# Patient Record
Sex: FEMALE | Race: WHITE | NOT HISPANIC OR LATINO | Employment: FULL TIME | URBAN - METROPOLITAN AREA
[De-identification: names, ages, dates, MRNs, and addresses within clinical notes are randomized per-mention and may not be internally consistent; named-entity substitution may affect disease eponyms.]

---

## 2021-08-12 ENCOUNTER — TELEPHONE (OUTPATIENT)
Dept: FAMILY MEDICINE CLINIC | Facility: CLINIC | Age: 36
End: 2021-08-12

## 2021-08-12 NOTE — TELEPHONE ENCOUNTER
Caller: June Reid    Relationship: Self    Best call back number: [phone]    Who is your current provider: N/A    Who would you like your new provider to be: DR CORREA    What are your reasons for transferring care: NO PCP    Additional notes: DR HARINI GREENBERG (CHILDRENS PEDIATRICIAN) RECOMMENDED JUNE

## 2021-11-16 ENCOUNTER — TELEPHONE (OUTPATIENT)
Dept: FAMILY MEDICINE CLINIC | Facility: CLINIC | Age: 36
End: 2021-11-16

## 2022-01-20 ENCOUNTER — OFFICE VISIT (OUTPATIENT)
Dept: FAMILY MEDICINE CLINIC | Facility: CLINIC | Age: 37
End: 2022-01-20

## 2022-01-20 ENCOUNTER — LAB (OUTPATIENT)
Dept: FAMILY MEDICINE CLINIC | Facility: CLINIC | Age: 37
End: 2022-01-20

## 2022-01-20 VITALS
RESPIRATION RATE: 18 BRPM | SYSTOLIC BLOOD PRESSURE: 111 MMHG | DIASTOLIC BLOOD PRESSURE: 79 MMHG | TEMPERATURE: 98.7 F | HEIGHT: 68 IN | BODY MASS INDEX: 27.43 KG/M2 | OXYGEN SATURATION: 100 % | WEIGHT: 181 LBS | HEART RATE: 72 BPM

## 2022-01-20 DIAGNOSIS — Z00.00 PREVENTATIVE HEALTH CARE: Primary | ICD-10-CM

## 2022-01-20 DIAGNOSIS — F98.8 ATTENTION DEFICIT DISORDER (ADD) WITHOUT HYPERACTIVITY: ICD-10-CM

## 2022-01-20 LAB
BASOPHILS # BLD AUTO: 0.06 10*3/MM3 (ref 0–0.2)
BASOPHILS NFR BLD AUTO: 0.6 % (ref 0–1.5)
DEPRECATED RDW RBC AUTO: 39.2 FL (ref 37–54)
EOSINOPHIL # BLD AUTO: 0.11 10*3/MM3 (ref 0–0.4)
EOSINOPHIL NFR BLD AUTO: 1.2 % (ref 0.3–6.2)
ERYTHROCYTE [DISTWIDTH] IN BLOOD BY AUTOMATED COUNT: 12.3 % (ref 12.3–15.4)
HCT VFR BLD AUTO: 45.5 % (ref 34–46.6)
HGB BLD-MCNC: 15 G/DL (ref 12–15.9)
IMM GRANULOCYTES # BLD AUTO: 0.04 10*3/MM3 (ref 0–0.05)
IMM GRANULOCYTES NFR BLD AUTO: 0.4 % (ref 0–0.5)
LYMPHOCYTES # BLD AUTO: 1.98 10*3/MM3 (ref 0.7–3.1)
LYMPHOCYTES NFR BLD AUTO: 21.3 % (ref 19.6–45.3)
MCH RBC QN AUTO: 28.9 PG (ref 26.6–33)
MCHC RBC AUTO-ENTMCNC: 33 G/DL (ref 31.5–35.7)
MCV RBC AUTO: 87.7 FL (ref 79–97)
MONOCYTES # BLD AUTO: 0.9 10*3/MM3 (ref 0.1–0.9)
MONOCYTES NFR BLD AUTO: 9.7 % (ref 5–12)
NEUTROPHILS NFR BLD AUTO: 6.19 10*3/MM3 (ref 1.7–7)
NEUTROPHILS NFR BLD AUTO: 66.8 % (ref 42.7–76)
NRBC BLD AUTO-RTO: 0 /100 WBC (ref 0–0.2)
PLATELET # BLD AUTO: 357 10*3/MM3 (ref 140–450)
PMV BLD AUTO: 10.1 FL (ref 6–12)
RBC # BLD AUTO: 5.19 10*6/MM3 (ref 3.77–5.28)
WBC NRBC COR # BLD: 9.28 10*3/MM3 (ref 3.4–10.8)

## 2022-01-20 PROCEDURE — 80053 COMPREHEN METABOLIC PANEL: CPT | Performed by: INTERNAL MEDICINE

## 2022-01-20 PROCEDURE — 82306 VITAMIN D 25 HYDROXY: CPT | Performed by: INTERNAL MEDICINE

## 2022-01-20 PROCEDURE — 85025 COMPLETE CBC W/AUTO DIFF WBC: CPT | Performed by: INTERNAL MEDICINE

## 2022-01-20 PROCEDURE — 36415 COLL VENOUS BLD VENIPUNCTURE: CPT | Performed by: INTERNAL MEDICINE

## 2022-01-20 PROCEDURE — 84443 ASSAY THYROID STIM HORMONE: CPT | Performed by: INTERNAL MEDICINE

## 2022-01-20 PROCEDURE — 99385 PREV VISIT NEW AGE 18-39: CPT | Performed by: INTERNAL MEDICINE

## 2022-01-20 RX ORDER — MULTIPLE VITAMINS W/ MINERALS TAB 9MG-400MCG
1 TAB ORAL DAILY
COMMUNITY
End: 2022-12-15

## 2022-01-20 NOTE — PROGRESS NOTES
Rooming Tab(CC,VS,Pt Hx,Fall Screen)  Chief Complaint   Patient presents with   • Establish Care     Patient indicated their consent to have their visit recorded and processed for the purpose of documenting their care today.    Subjective   June Reid is a 36 y.o. female who presents to [facility] care with new doctor.    ADD  The patient reports a history of ADD. She was diagnosed as a child. She denies taking medication in the past. She has been seen at a clinic and had a screening recently. She was prescribed Strattera which she took for a while and it did not help and caused her to feel tired.    Joint pain  The patient reports a history of knee and hip pain which she associates with her weight. She has lost weight in the past which alleviated the knee pain.     Skin lesions  She reports a history of skin lesions. She has a skin lesion on her let temproal which she would like removed today. She has had a skin lesion removed from her back in the past. She does follow with dermatology.    History of kidney stones  The patient reports a history of 3 kidney stones. She no longer drinks tea.    Surgical history  The patient reports bilateral ear surgery.    Health maintenance  The patient reports that she does take birth control.  She denies any reason to have needed a colonoscopy in the past.  She is up to date on her influenza, tetanus and hepatitis A vaccinations. She is due for a COVID-19 booster.  She did have a mammogram in .   She denies any problems with constipation, diarrhea, migraines, chest pains.  She denies any thyroid issues.    Family history  The patient reports that her mother  at age 50 secondary to non-smoking lung cancer. Her father has a history of cholesterol and blood pressure problems. Her father has a history of skin lesions removed that were not melanoma.  I have reviewed and updated her medications, medical history and problem list during today's office visit.     Patient  "Care Team:  Grace Rene MD as PCP - General (Internal Medicine)    Problem List Tab  Medications Tab  Synopsis Tab  Chart Review Tab  Care Everywhere Tab  Immunizations Tab  Patient History Tab    Social History     Tobacco Use   • Smoking status: Never Smoker   • Smokeless tobacco: Never Used   Substance Use Topics   • Alcohol use: Yes     Comment: occ       Review of Systems  A review of systems was performed, and the pertinent positives are noted in the HPI.    Objective     Rooming Tab(CC,VS,Pt Hx,Fall Screen)  /79   Pulse 72   Temp 98.7 °F (37.1 °C) (Temporal)   Resp 18   Ht 172.7 cm (68\")   Wt 82.1 kg (181 lb)   LMP 12/23/2021 (Within Days)   SpO2 100%   Breastfeeding No   BMI 27.52 kg/m²     Body mass index is 27.52 kg/m².    Physical Exam  Vitals and nursing note reviewed.   Constitutional:       Appearance: Normal appearance. She is well-developed.   HENT:      Head: Normocephalic and atraumatic.      Right Ear: Tympanic membrane normal.      Left Ear: Tympanic membrane normal.      Nose: No rhinorrhea.      Mouth/Throat:      Pharynx: No posterior oropharyngeal erythema.   Eyes:      Pupils: Pupils are equal, round, and reactive to light.   Cardiovascular:      Rate and Rhythm: Normal rate and regular rhythm.      Pulses: Normal pulses.      Heart sounds: Normal heart sounds. No murmur heard.      Pulmonary:      Effort: Pulmonary effort is normal.      Breath sounds: Normal breath sounds.   Abdominal:      General: Bowel sounds are normal. There is no distension.      Palpations: Abdomen is soft.   Musculoskeletal:         General: No tenderness.      Cervical back: Normal range of motion and neck supple.   Skin:     Capillary Refill: Capillary refill takes less than 2 seconds.      Findings: Lesion present.   Neurological:      Mental Status: She is alert and oriented to person, place, and time.   Psychiatric:         Mood and Affect: Mood normal.         Behavior: Behavior " normal.         Statin Choice Calculator  Data Reviewed:         The data below has been reviewed by Grace Rene MD on 01/20/2022.      Lab Results   Component Value Date    BUN 21 (H) 01/20/2022    CREATININE 0.78 01/20/2022    EGFRIFNONA 83 01/20/2022     01/20/2022    K 4.0 01/20/2022     01/20/2022    CALCIUM 9.6 01/20/2022    ALBUMIN 4.90 01/20/2022    BILITOT 0.6 01/20/2022    ALKPHOS 54 01/20/2022    AST 18 01/20/2022    ALT 15 01/20/2022    WBC 9.28 01/20/2022    RBC 5.19 01/20/2022    HCT 45.5 01/20/2022    MCV 87.7 01/20/2022    MCH 28.9 01/20/2022    TSH 0.986 01/20/2022    LPYV23WQ 27.8 (L) 01/20/2022      Assessment/Plan   Order Review Tab  Health Maintenance Tab  Patient Plan/Order Tab  Diagnoses and all orders for this visit:    1. Preventative health care (Primary)  -     Discontinue: lisdexamfetamine (Vyvanse) 40 MG capsule; Take 1 capsule by mouth Every Morning  Dispense: 30 capsule; Refill: 0  -     Comprehensive Metabolic Panel  -     CBC Auto Differential  -     Vitamin D 25 Hydroxy  -     TSH    2. Attention deficit disorder (ADD) without hyperactivity  Assessment & Plan:  CAARS forms given to patient to be returned next week.  [place] tart vyvanse- advised to get coupon try at 40mg and adjust as needed.    3. Skin lesion        - Skin lesion was removed today via cryotherapy.        - Follow with dermatology at a minimum of every other year.        Wrapup Tab  Return if symptoms worsen or fail to improve.       During this visit for their annual exam, we reviewed their personal history, social history and family history.  We went over their medications and all the recommended health maintenence items for their age group. They were given the opportunity to ask questions and discuss other concerns.      Patient's Body mass index is 27.52 kg/m². indicating that she is within normal range (BMI 18.5-24.9). No BMI management plan needed..

## 2022-01-21 ENCOUNTER — TELEPHONE (OUTPATIENT)
Dept: FAMILY MEDICINE CLINIC | Facility: CLINIC | Age: 37
End: 2022-01-21

## 2022-01-21 DIAGNOSIS — F98.8 ATTENTION DEFICIT DISORDER (ADD) WITHOUT HYPERACTIVITY: Primary | ICD-10-CM

## 2022-01-21 LAB
25(OH)D3 SERPL-MCNC: 27.8 NG/ML (ref 30–100)
ALBUMIN SERPL-MCNC: 4.9 G/DL (ref 3.5–5.2)
ALBUMIN/GLOB SERPL: 2.3 G/DL
ALP SERPL-CCNC: 54 U/L (ref 39–117)
ALT SERPL W P-5'-P-CCNC: 15 U/L (ref 1–33)
ANION GAP SERPL CALCULATED.3IONS-SCNC: 11.4 MMOL/L (ref 5–15)
AST SERPL-CCNC: 18 U/L (ref 1–32)
BILIRUB SERPL-MCNC: 0.6 MG/DL (ref 0–1.2)
BUN SERPL-MCNC: 21 MG/DL (ref 6–20)
BUN/CREAT SERPL: 26.9 (ref 7–25)
CALCIUM SPEC-SCNC: 9.6 MG/DL (ref 8.6–10.5)
CHLORIDE SERPL-SCNC: 102 MMOL/L (ref 98–107)
CO2 SERPL-SCNC: 23.6 MMOL/L (ref 22–29)
CREAT SERPL-MCNC: 0.78 MG/DL (ref 0.57–1)
GFR SERPL CREATININE-BSD FRML MDRD: 83 ML/MIN/1.73
GLOBULIN UR ELPH-MCNC: 2.1 GM/DL
GLUCOSE SERPL-MCNC: 88 MG/DL (ref 65–99)
POTASSIUM SERPL-SCNC: 4 MMOL/L (ref 3.5–5.2)
PROT SERPL-MCNC: 7 G/DL (ref 6–8.5)
SODIUM SERPL-SCNC: 137 MMOL/L (ref 136–145)
TSH SERPL DL<=0.05 MIU/L-ACNC: 0.99 UIU/ML (ref 0.27–4.2)

## 2022-01-21 RX ORDER — DEXMETHYLPHENIDATE HYDROCHLORIDE 20 MG/1
20 CAPSULE, EXTENDED RELEASE ORAL DAILY
Qty: 30 CAPSULE | Refills: 0 | Status: SHIPPED | OUTPATIENT
Start: 2022-01-21 | End: 2022-02-21 | Stop reason: SDUPTHER

## 2022-01-21 NOTE — TELEPHONE ENCOUNTER
Caller: June Reid    Relationship: Self    Best call back number: [phone]  What medication are you requesting: ALTERNATIVE FOR VYVANSE, TOO EXPENSIVE, WILL NEED SOMETHING CALLED IN LESS EXPENSIVE  What are your current symptoms:     How long have you been experiencing symptoms:    Have you had these symptoms before:    [x] Yes  [] No    Have you been treated for these symptoms before:   [x] Yes  [] No    If a prescription is needed, what is your preferred pharmacy and phone number: [place] [id number]  [facility]  [phone] [facility] [phone]      Additional notes:

## 2022-01-22 PROBLEM — F98.8 ATTENTION DEFICIT DISORDER (ADD) WITHOUT HYPERACTIVITY: Status: ACTIVE | Noted: 2022-01-22

## 2022-01-22 PROBLEM — Z00.00 PREVENTATIVE HEALTH CARE: Status: ACTIVE | Noted: 2022-01-22

## 2022-01-22 NOTE — ASSESSMENT & PLAN NOTE
CAARS forms given to patient to be returned next week.  Jase franco- advised to get coupon try at 40mg and adjust as needed.

## 2022-02-21 DIAGNOSIS — F98.8 ATTENTION DEFICIT DISORDER (ADD) WITHOUT HYPERACTIVITY: ICD-10-CM

## 2022-02-21 RX ORDER — DEXMETHYLPHENIDATE HYDROCHLORIDE 20 MG/1
20 CAPSULE, EXTENDED RELEASE ORAL DAILY
Qty: 30 CAPSULE | Refills: 0 | Status: SHIPPED | OUTPATIENT
Start: 2022-02-21 | End: 2022-03-24 | Stop reason: SDUPTHER

## 2022-02-21 NOTE — TELEPHONE ENCOUNTER
Incoming Refill Request      Medication requested (name and dose):   dexmethylphenidate XR (Focalin XR) 20 MG 24 hr capsule ()  20 mg, Daily         Pharmacy where request should be sent: [address] [place] - [phone]  - [phone]  [phone]    Additional details provided by patient: PATIENT IS OUT OF MEDICATION     Best call back number: [phone]    Does the patient have less than a 3 day supply:  [x] Yes  [] No    Steve Long Rep  22, 15:51 EST

## 2022-03-24 DIAGNOSIS — F98.8 ATTENTION DEFICIT DISORDER (ADD) WITHOUT HYPERACTIVITY: ICD-10-CM

## 2022-03-24 RX ORDER — DEXMETHYLPHENIDATE HYDROCHLORIDE 20 MG/1
20 CAPSULE, EXTENDED RELEASE ORAL DAILY
Qty: 30 CAPSULE | Refills: 0 | Status: SHIPPED | OUTPATIENT
Start: 2022-03-24 | End: 2022-04-25 | Stop reason: SDUPTHER

## 2022-03-24 NOTE — TELEPHONE ENCOUNTER
Caller: June Reid    Relationship: Self    Best call back number: [phone]    Requested Prescriptions:   Requested Prescriptions     Pending Prescriptions Disp Refills   • dexmethylphenidate XR (Focalin XR) 20 MG 24 hr capsule 30 capsule 0     Sig: Take 1 capsule by mouth Daily for 30 days        Pharmacy where request should be sent: [address] [phone] [facility] [phone]     Additional details provided by patient:     Does the patient have less than a 3 day supply:  [x] Yes  [] No    Shilpa Mathews   03/24/22 13:58 EDT

## 2022-04-25 DIAGNOSIS — F98.8 ATTENTION DEFICIT DISORDER (ADD) WITHOUT HYPERACTIVITY: ICD-10-CM

## 2022-04-25 NOTE — TELEPHONE ENCOUNTER
Caller: June Reid    Relationship: Self    Best call back number: [phone]    Requested Prescriptions:   Requested Prescriptions     Pending Prescriptions Disp Refills   • dexmethylphenidate XR (Focalin XR) 20 MG 24 hr capsule 30 capsule 0     Sig: Take 1 capsule by mouth Daily for 30 days        Pharmacy where request should be sent: [address] [phone] [facility] [phone] FX     Does the patient have less than a 3 day supply:  [x] Yes  [] No    Steve Hill Rep   04/25/22 14:22 EDT

## 2022-04-26 RX ORDER — DEXMETHYLPHENIDATE HYDROCHLORIDE 20 MG/1
20 CAPSULE, EXTENDED RELEASE ORAL DAILY
Qty: 30 CAPSULE | Refills: 0 | Status: SHIPPED | OUTPATIENT
Start: 2022-04-26 | End: 2022-05-24 | Stop reason: SDUPTHER

## 2022-05-24 ENCOUNTER — OFFICE VISIT (OUTPATIENT)
Dept: FAMILY MEDICINE CLINIC | Facility: CLINIC | Age: 37
End: 2022-05-24

## 2022-05-24 VITALS
OXYGEN SATURATION: 98 % | RESPIRATION RATE: 18 BRPM | WEIGHT: 164 LBS | TEMPERATURE: 97.5 F | DIASTOLIC BLOOD PRESSURE: 86 MMHG | HEIGHT: 68 IN | HEART RATE: 85 BPM | BODY MASS INDEX: 24.86 KG/M2 | SYSTOLIC BLOOD PRESSURE: 118 MMHG

## 2022-05-24 DIAGNOSIS — F98.8 ATTENTION DEFICIT DISORDER (ADD) WITHOUT HYPERACTIVITY: Primary | ICD-10-CM

## 2022-05-24 DIAGNOSIS — F98.8 ATTENTION DEFICIT DISORDER (ADD) WITHOUT HYPERACTIVITY: ICD-10-CM

## 2022-05-24 PROCEDURE — 99213 OFFICE O/P EST LOW 20 MIN: CPT | Performed by: INTERNAL MEDICINE

## 2022-05-24 RX ORDER — DEXMETHYLPHENIDATE HYDROCHLORIDE 20 MG/1
20 CAPSULE, EXTENDED RELEASE ORAL DAILY
Qty: 30 CAPSULE | Refills: 0 | Status: SHIPPED | OUTPATIENT
Start: 2022-05-24 | End: 2022-08-01 | Stop reason: SDUPTHER

## 2022-05-24 RX ORDER — LORATADINE 10 MG/1
10 TABLET ORAL DAILY
COMMUNITY
End: 2022-12-15

## 2022-08-01 DIAGNOSIS — F98.8 ATTENTION DEFICIT DISORDER (ADD) WITHOUT HYPERACTIVITY: ICD-10-CM

## 2022-08-01 NOTE — TELEPHONE ENCOUNTER
Caller: June Reid    Relationship: Self    Best call back number:[phone]    Requested Prescriptions:   Requested Prescriptions     Pending Prescriptions Disp Refills   • dexmethylphenidate XR (Focalin XR) 20 MG 24 hr capsule 30 capsule 0     Sig: Take 1 capsule by mouth Daily for 30 days        Pharmacy where request should be sent: [address] [phone] [facility] [phone] FX       Does the patient have less than a 3 day supply:  [x] Yes  [] No    Steve Hill Rep   08/01/22 16:33 EDT

## 2022-08-02 RX ORDER — DEXMETHYLPHENIDATE HYDROCHLORIDE 20 MG/1
20 CAPSULE, EXTENDED RELEASE ORAL DAILY
Qty: 30 CAPSULE | Refills: 0 | Status: SHIPPED | OUTPATIENT
Start: 2022-08-02 | End: 2022-09-09 | Stop reason: SDUPTHER

## 2022-09-09 ENCOUNTER — TELEPHONE (OUTPATIENT)
Dept: FAMILY MEDICINE CLINIC | Facility: CLINIC | Age: 37
End: 2022-09-09

## 2022-09-09 DIAGNOSIS — F98.8 ATTENTION DEFICIT DISORDER (ADD) WITHOUT HYPERACTIVITY: ICD-10-CM

## 2022-09-09 RX ORDER — DEXMETHYLPHENIDATE HYDROCHLORIDE 20 MG/1
20 CAPSULE, EXTENDED RELEASE ORAL DAILY
Qty: 30 CAPSULE | Refills: 0 | Status: SHIPPED | OUTPATIENT
Start: 2022-09-09 | End: 2022-10-17 | Stop reason: SDUPTHER

## 2022-09-09 NOTE — TELEPHONE ENCOUNTER
Incoming Refill Request      Medication requested (name and dose):   dexmethylphenidate XR (Focalin XR) 20 MG 24 hr capsule ()  20 mg, Daily         Pharmacy where request should be sent: [address] - [phone]  - [phone]  [phone]    Additional details provided by patient: PATIENT IS OUT OF MEDICATION     Best call back number: [phone]    Does the patient have less than a 3 day supply:  [x] Yes  [] No    Steve Long Rep  22, 16:42 EDT

## 2022-10-17 DIAGNOSIS — F98.8 ATTENTION DEFICIT DISORDER (ADD) WITHOUT HYPERACTIVITY: ICD-10-CM

## 2022-10-17 RX ORDER — DEXMETHYLPHENIDATE HYDROCHLORIDE 20 MG/1
20 CAPSULE, EXTENDED RELEASE ORAL DAILY
Qty: 30 CAPSULE | Refills: 0 | Status: SHIPPED | OUTPATIENT
Start: 2022-10-17 | End: 2022-11-29 | Stop reason: SDUPTHER

## 2022-10-17 NOTE — TELEPHONE ENCOUNTER
Caller: June Reid    Relationship: Self    Best call back number: [phone]    Requested Prescriptions:   Requested Prescriptions     Pending Prescriptions Disp Refills   • dexmethylphenidate XR (Focalin XR) 20 MG 24 hr capsule 30 capsule 0     Sig: Take 1 capsule by mouth Daily for 30 days        Pharmacy where request should be sent: [facility] [address] [place] [address] [phone] [facility] [phone]     Additional details provided by patient: RAN OUT LAST WEEK    Does the patient have less than a 3 day supply:  [x] Yes  [] No    Steve Odom Rep   10/17/22 13:21 EDT

## 2022-11-29 DIAGNOSIS — F98.8 ATTENTION DEFICIT DISORDER (ADD) WITHOUT HYPERACTIVITY: ICD-10-CM

## 2022-11-30 RX ORDER — DEXMETHYLPHENIDATE HYDROCHLORIDE 20 MG/1
20 CAPSULE, EXTENDED RELEASE ORAL DAILY
Qty: 15 CAPSULE | Refills: 0 | Status: SHIPPED | OUTPATIENT
Start: 2022-11-30 | End: 2022-12-15 | Stop reason: SDUPTHER

## 2022-12-15 ENCOUNTER — OFFICE VISIT (OUTPATIENT)
Dept: FAMILY MEDICINE CLINIC | Facility: CLINIC | Age: 37
End: 2022-12-15

## 2022-12-15 VITALS
WEIGHT: 190 LBS | RESPIRATION RATE: 16 BRPM | HEIGHT: 68 IN | BODY MASS INDEX: 28.79 KG/M2 | TEMPERATURE: 97.8 F | DIASTOLIC BLOOD PRESSURE: 74 MMHG | SYSTOLIC BLOOD PRESSURE: 110 MMHG | OXYGEN SATURATION: 99 % | HEART RATE: 74 BPM

## 2022-12-15 DIAGNOSIS — F98.8 ATTENTION DEFICIT DISORDER (ADD) WITHOUT HYPERACTIVITY: ICD-10-CM

## 2022-12-15 PROCEDURE — 99213 OFFICE O/P EST LOW 20 MIN: CPT | Performed by: INTERNAL MEDICINE

## 2022-12-15 RX ORDER — DEXMETHYLPHENIDATE HYDROCHLORIDE 20 MG/1
20 CAPSULE, EXTENDED RELEASE ORAL DAILY
Qty: 30 CAPSULE | Refills: 0 | Status: SHIPPED | OUTPATIENT
Start: 2022-12-15 | End: 2023-01-23 | Stop reason: SDUPTHER

## 2022-12-15 NOTE — PROGRESS NOTES
"Chief Complaint  ADD    Subjective        June Reid presents to [facility]  History of Present Illness     ADD follow up  Overall doing well. States the focalin is working for. Able to sleep without difficulty. Noticing it is wearing off around dinner time. Taking it 6-7 times a week. No weight loss. No tachycardia or irregular heartbeats. No increase in headaches. Doesn't feel like she has to skip lunch. Notices when she misses a dose.     Objective   Vital Signs:  /74   Pulse 74   Temp 97.8 °F (36.6 °C)   Resp 16   Ht 172.7 cm (68\")   Wt 86.2 kg (190 lb)   SpO2 99%   BMI 28.89 kg/m²   Estimated body mass index is 28.89 kg/m² as calculated from the following:    Height as of this encounter: 172.7 cm (68\").    Weight as of this encounter: 86.2 kg (190 lb).          Physical Exam  Constitutional:       Appearance: Normal appearance. She is normal weight.   Eyes:      Extraocular Movements: Extraocular movements intact.      Pupils: Pupils are equal, round, and reactive to light.   Cardiovascular:      Rate and Rhythm: Normal rate and regular rhythm.      Pulses: Normal pulses.      Heart sounds: No murmur heard.  Pulmonary:      Effort: Pulmonary effort is normal. No respiratory distress.      Breath sounds: Normal breath sounds.   Abdominal:      General: Abdomen is flat. Bowel sounds are normal. There is no distension.      Palpations: Abdomen is soft.   Skin:     General: Skin is warm.      Capillary Refill: Capillary refill takes less than 2 seconds.   Neurological:      General: No focal deficit present.      Mental Status: She is alert.   Psychiatric:         Mood and Affect: Mood normal.         Behavior: Behavior normal.        Result Review :                Assessment and Plan   Diagnoses and all orders for this visit:    1. Attention deficit disorder (ADD) without hyperactivity  -     dexmethylphenidate XR (Focalin XR) 20 MG 24 hr capsule; Take 1 " capsule by mouth Daily for 15 days Needs appt  Dispense: 30 capsule; Refill: 0    Patient doing well with the focalin for her ADD. No hypertension or tachycardia noted. No weight loss. Filling prescription as right interval. Continue current prescription now.     I have seen and examined June Reid with resident, Dr. Jean-Baptiste and agree with findings and assessment and plan- doing well with current regimen for ADD. BP and HR good control    This document has been electronically signed by Grace Rene MD on December 18, 2022 13:17 EST           Follow Up   Return in about 6 months (around 6/15/2023), or if symptoms worsen or fail to improve, for Annual physical.  Patient was given instructions and counseling regarding her condition or for health maintenance advice. Please see specific information pulled into the AVS if appropriate.

## 2023-01-23 DIAGNOSIS — F98.8 ATTENTION DEFICIT DISORDER (ADD) WITHOUT HYPERACTIVITY: ICD-10-CM

## 2023-01-23 RX ORDER — DEXMETHYLPHENIDATE HYDROCHLORIDE 20 MG/1
20 CAPSULE, EXTENDED RELEASE ORAL DAILY
Qty: 30 CAPSULE | Refills: 0 | Status: SHIPPED | OUTPATIENT
Start: 2023-01-23 | End: 2023-03-02 | Stop reason: SDUPTHER

## 2023-01-23 NOTE — TELEPHONE ENCOUNTER
Caller: June Reid    Relationship: Self    Requested Prescriptions:   Requested Prescriptions     Pending Prescriptions Disp Refills   • dexmethylphenidate XR (Focalin XR) 20 MG 24 hr capsule 30 capsule 0     Sig: Take 1 capsule by mouth Daily for 15 days [facility]        Pharmacy where request should be sent: [facility] [address] [place], [place] [id number] [facility] [phone] [facility] [phone] FX     Additional details provided by patient: PATIENT TOOK LAST PILL TODAY.     Does the patient have less than a 3 day supply:  [x] Yes  [] No      Steve Fitzpatrick Rep   01/23/23 13:58 EST

## 2023-03-02 ENCOUNTER — TELEPHONE (OUTPATIENT)
Dept: FAMILY MEDICINE CLINIC | Facility: CLINIC | Age: 38
End: 2023-03-02
Payer: COMMERCIAL

## 2023-03-02 DIAGNOSIS — F98.8 ATTENTION DEFICIT DISORDER (ADD) WITHOUT HYPERACTIVITY: ICD-10-CM

## 2023-03-02 RX ORDER — DEXMETHYLPHENIDATE HYDROCHLORIDE 20 MG/1
20 CAPSULE, EXTENDED RELEASE ORAL DAILY
Qty: 30 CAPSULE | Refills: 0 | Status: SHIPPED | OUTPATIENT
Start: 2023-03-02 | End: 2023-04-07 | Stop reason: SDUPTHER

## 2023-03-02 NOTE — TELEPHONE ENCOUNTER
Caller: June Reid    Relationship: Self    Best call back number:[phone]    Requested Prescriptions:   Requested Prescriptions     Pending Prescriptions Disp Refills   • dexmethylphenidate XR (Focalin XR) 20 MG 24 hr capsule 30 capsule 0     Sig: Take 1 capsule by mouth Daily for 15 days Needs [facility]        Pharmacy where request should be sent: [facility] [address] [place] [address] [phone] [facility] [phone]     Additional details provided by patient:     Does the patient have less than a 3 day supply:  [] Yes  [] No    Would you like a call back once the refill request has been completed: [] Yes [] No    If the office needs to give you a call back, can they leave a voicemail: [] Yes [] No    Steve Alejandro Rep   03/02/23 16:20 EST         .”

## 2023-04-07 ENCOUNTER — TELEPHONE (OUTPATIENT)
Dept: FAMILY MEDICINE CLINIC | Facility: CLINIC | Age: 38
End: 2023-04-07
Payer: COMMERCIAL

## 2023-04-07 DIAGNOSIS — F98.8 ATTENTION DEFICIT DISORDER (ADD) WITHOUT HYPERACTIVITY: ICD-10-CM

## 2023-04-07 RX ORDER — DEXMETHYLPHENIDATE HYDROCHLORIDE 20 MG/1
20 CAPSULE, EXTENDED RELEASE ORAL DAILY
Qty: 30 CAPSULE | Refills: 0 | Status: SHIPPED | OUTPATIENT
Start: 2023-04-07 | End: 2023-05-07

## 2023-04-07 NOTE — TELEPHONE ENCOUNTER
Incoming Refill Request      Medication requested (name and dose):   dexmethylphenidate XR (Focalin XR) 20 MG 24 hr capsule ()  20 mg, Daily         Pharmacy where request should be sent: [address] [place] - [phone]  - [phone]  [phone]    Additional details provided by patient: NONE    Best call back number: [phone]    Does the patient have less than a 3 day supply:  [x] Yes  [] No    Steve Long Rep  23, 10:04 EDT

## 2023-05-22 DIAGNOSIS — F98.8 ATTENTION DEFICIT DISORDER (ADD) WITHOUT HYPERACTIVITY: ICD-10-CM

## 2023-05-22 RX ORDER — DEXMETHYLPHENIDATE HYDROCHLORIDE 20 MG/1
20 CAPSULE, EXTENDED RELEASE ORAL DAILY
Qty: 30 CAPSULE | Refills: 0 | Status: SHIPPED | OUTPATIENT
Start: 2023-05-22 | End: 2023-06-21

## 2023-05-22 NOTE — TELEPHONE ENCOUNTER
Caller: June Reid    Relationship: Self    Best call back number: [phone]    Requested Prescriptions:   Requested Prescriptions     Pending Prescriptions Disp Refills   • dexmethylphenidate XR (Focalin XR) 20 MG 24 hr capsule 30 capsule 0     Sig: Take 1 capsule by mouth Daily for 30 days        Pharmacy where request should be sent: [address] [phone] [facility] [phone]     Last office visit with prescribing clinician: 12/15/2022   Last telemedicine visit with prescribing clinician: 4/7/2023   Next office visit with prescribing clinician: 6/15/2023     Additional details provided by patient: PATIENT IS OUT     Does the patient have less than a 3 day supply:  [x] Yes  [x] No    Would you like a call back once the refill request has been completed: [] Yes [x] No    If the office needs to give you a call back, can they leave a voicemail: [] Yes [x] No    Steve Ahmadi Rep   05/22/23 14:12 EDT

## 2023-06-15 ENCOUNTER — OFFICE VISIT (OUTPATIENT)
Dept: FAMILY MEDICINE CLINIC | Facility: CLINIC | Age: 38
End: 2023-06-15
Payer: COMMERCIAL

## 2023-06-15 VITALS
WEIGHT: 195 LBS | HEART RATE: 99 BPM | DIASTOLIC BLOOD PRESSURE: 78 MMHG | BODY MASS INDEX: 29.55 KG/M2 | SYSTOLIC BLOOD PRESSURE: 118 MMHG | OXYGEN SATURATION: 97 % | HEIGHT: 68 IN | RESPIRATION RATE: 16 BRPM

## 2023-06-15 DIAGNOSIS — Z13.89 ENCOUNTER FOR SURVEILLANCE OF ABNORMAL NEVI: Primary | ICD-10-CM

## 2023-06-15 DIAGNOSIS — F98.8 ATTENTION DEFICIT DISORDER (ADD) WITHOUT HYPERACTIVITY: ICD-10-CM

## 2023-06-15 RX ORDER — DEXMETHYLPHENIDATE HYDROCHLORIDE 20 MG/1
20 CAPSULE, EXTENDED RELEASE ORAL DAILY
Qty: 30 CAPSULE | Refills: 0 | Status: SHIPPED | OUTPATIENT
Start: 2023-06-15 | End: 2023-07-15

## 2023-06-15 NOTE — PROGRESS NOTES
Rooming Tab(CC,VS,Pt Hx,Fall Screen)  Chief Complaint   Patient presents with    Follow-up       Subjective     I have reviewed and updated her medications, medical history and problem list during today's office visit.     The patient states that her blood pressure surprised her. She has been sleeping well. Reports that most days she can go back to sleep. She reports that she has decreased her caffeine intake and she is able to tell a difference in her blood pressure. The patient reports that she will usually have a cup of coffee in the morning and on the weekends, she occasionally will have 2. The patient reports that she needs to exercise to relieve stress, but she has not. The patient has lost weight but has gained weight back. She states that when she was losing weight, she was consistently doing keto diet. The patient reports that for the last couple of weeks she has been watching her carbs. The patient reports that she has been focusing well and the Focalin is doing well for her. She reports that her dosage is doing well for her, so she does not need any adjustments.     Skin  She states that she would like a female dermatologist. She states that she has had precancerous spots on her back removed. She states that she would like her moles to be monitored. She states that she has a lot of moles that have been there for a long time that are of concern that she would like to have removed.    Health maintenance  She states that she is going to GYN for her pelvic exam every 3 years. She states that she had a pelvic exam in 01/2023 or 02/2023. She states that she was told starting at 40 years old for mammograms. She states that she has an appointment in the future for a 6-month check.    Patient Care Team:  Grace Rene MD as PCP - General (Internal Medicine)    Problem List Tab  Medications Tab  Synopsis Tab  Chart Review Tab  Care Everywhere Tab  Immunizations Tab  Patient History Tab    Social History  "    Tobacco Use    Smoking status: Never    Smokeless tobacco: Never   Substance Use Topics    Alcohol use: Yes     Comment: occ       Review of Systems    Objective     Rooming Tab(CC,VS,Pt Hx,Fall Screen)  /78 (BP Location: Right arm, Patient Position: Sitting, Cuff Size: Adult)   Pulse 99   Resp 16   Ht 172.7 cm (68\")   Wt 88.5 kg (195 lb)   SpO2 97%   BMI 29.65 kg/m²     Body mass index is 29.65 kg/m².    Physical Exam  Vitals and nursing note reviewed.   Constitutional:       Appearance: Normal appearance. She is well-developed.   HENT:      Head: Normocephalic and atraumatic.      Right Ear: Tympanic membrane normal.      Left Ear: Tympanic membrane normal.      Nose: No rhinorrhea.      Mouth/Throat:      Pharynx: No posterior oropharyngeal erythema.   Eyes:      Pupils: Pupils are equal, round, and reactive to light.   Cardiovascular:      Rate and Rhythm: Normal rate and regular rhythm.      Pulses: Normal pulses.      Heart sounds: Normal heart sounds. No murmur heard.  Pulmonary:      Effort: Pulmonary effort is normal.      Breath sounds: Normal breath sounds.   Musculoskeletal:         General: No tenderness.      Cervical back: Normal range of motion and neck supple.   Skin:     Capillary Refill: Capillary refill takes less than 2 seconds.   Neurological:      Mental Status: She is alert and oriented to person, place, and time.   Psychiatric:         Mood and Affect: Mood normal.         Behavior: Behavior normal.        Statin Choice Calculator  Data Reviewed:         The data below has been reviewed by Grace Rene MD on 06/15/2023.          Assessment & Plan   Order Review Tab  Health Maintenance Tab  Patient Plan/Order Tab  Diagnoses and all orders for this visit:    1. Encounter for surveillance of abnormal nevi (Primary)  Comments:  refer to derm fro yearly checks  Orders:  -     Ambulatory Referral to Dermatology    2. Attention deficit disorder (ADD) without " hyperactivity  Comments:  doing well with crrent dose- will refill and continue  Orders:  -     dexmethylphenidate XR (Focalin XR) 20 MG 24 hr capsule; Take 1 capsule by mouth Daily for 30 days  Dispense: 30 capsule; Refill: 0      1. attention deficit hyperactivity disorder (ADHD), inattentive type (Primary)  - The patient will continue her current medication regimen.    2. Skin lesions  - The patient will be referred to Dr. Tessa Ruvalcaba.    follow-up 1 month.  Wrapup Tab  Return in about 6 months (around 12/15/2023), or if symptoms worsen or fail to improve.       They were informed of the diagnosis and treatment plan and directed to f/u for any further problems or concerns.    Transcribed from ambient dictation for Grace Rene MD by Nelia Cowan.  06/15/23   18:04 EDT    Patient or patient representative verbalized consent to the visit recording.  I have personally performed the services described in this document as transcribed by the above individual, and it is both accurate and complete.  Grace Rene MD  6/18/2023  09:04 EDT

## 2023-08-22 ENCOUNTER — OFFICE VISIT (OUTPATIENT)
Dept: FAMILY MEDICINE CLINIC | Facility: CLINIC | Age: 38
End: 2023-08-22
Payer: COMMERCIAL

## 2023-08-22 VITALS
SYSTOLIC BLOOD PRESSURE: 104 MMHG | HEIGHT: 68 IN | OXYGEN SATURATION: 98 % | WEIGHT: 198.2 LBS | BODY MASS INDEX: 30.04 KG/M2 | DIASTOLIC BLOOD PRESSURE: 72 MMHG | HEART RATE: 75 BPM | RESPIRATION RATE: 18 BRPM

## 2023-08-22 DIAGNOSIS — F98.8 ATTENTION DEFICIT DISORDER (ADD) WITHOUT HYPERACTIVITY: Primary | ICD-10-CM

## 2023-08-22 DIAGNOSIS — R03.0 ELEVATED BLOOD-PRESSURE READING WITHOUT DIAGNOSIS OF HYPERTENSION: ICD-10-CM

## 2023-08-22 DIAGNOSIS — D22.9 MULTIPLE NEVI: ICD-10-CM

## 2023-08-22 PROCEDURE — 99214 OFFICE O/P EST MOD 30 MIN: CPT | Performed by: INTERNAL MEDICINE

## 2023-08-22 RX ORDER — DEXMETHYLPHENIDATE HYDROCHLORIDE 20 MG/1
20 CAPSULE, EXTENDED RELEASE ORAL DAILY
Qty: 30 CAPSULE | Refills: 0 | Status: SHIPPED | OUTPATIENT
Start: 2023-08-22 | End: 2023-09-21

## 2023-08-22 NOTE — PROGRESS NOTES
"Chief Complaint  ADD    HPI:    June Reid presents to [facility]    History of ADD without hyperactivity.  Previously has been on Focalin XR 20 mg daily. Patient thinks that she was diagnosed about 5-6 years ago when she was possibly having \"memory issues.\" Patient endorses symptoms including periods of inattention, impulsivity, restlessness resulting in functional impairment. Patient can sometimes have difficulty focusing, inattention to details, and can be forgetful. Mood overall good. Denies tobacco, recreational drug. Only occasional alcohol and caffeine. Denies new medications or changes in medications. Patient has been off medications about the last two months.  Denies side effects while on medication. Sleep good. Denies chest pain, palpitations.    Patient was previously followed with dermatology for surveillance of abnormal nevi. She went about 3 weeks ago and everything looked good. No biopsy.     Blood pressure recently has overall has been good. Trying to cut back caffeine which has improved significantly.    Preventative:    Social: Works as , 2 kids    Diet and Exercise: Acknowledges that she should eat healthier and exercise    Alcohol, Tobacco, and Recreational Drug use: Rare alcohol use, no tobacco or recreational drug use    Cancer screenings: Planning on discussing at preventative care visit  Colonoscopy: Due at age 44  Mammogram: Due at age 39  Pap/HPV: Undergoes routine Pap smears with GYN    Immunizations: Due for COVID booster    [employer] Directive: None on record    Review of Systems:  ROS negative unless otherwise noted in HPI above.    Past Medical History:   Diagnosis Date    Allergic     Kidney stone          Current Outpatient Medications:     dexmethylphenidate XR (Focalin XR) 20 MG 24 hr capsule, Take 1 capsule by mouth Daily for 30 days, Disp: 30 capsule, Rfl: 0    levonorgestrel (MIRENA) 20 MCG/24HR IUD, 1 each by " "Intrauterine route 1 (One) Time., Disp: , Rfl:     Social History     Socioeconomic History    Marital status:    Tobacco Use    Smoking status: Never    Smokeless tobacco: Never   Vaping Use    Vaping Use: Never used   Substance and Sexual Activity    Alcohol use: Yes     Comment: occ    Drug use: Never    Sexual activity: Yes     Partners: Male     Birth control/protection: I.U.D.        Objective   Vital Signs:  /72   Pulse 75   Resp 18   Ht 172.7 cm (68\")   Wt 89.9 kg (198 lb 3.2 oz)   SpO2 98%   BMI 30.14 kg/mý   Estimated body mass index is 30.14 kg/mý as calculated from the following:    Height as of this encounter: 172.7 cm (68\").    Weight as of this encounter: 89.9 kg (198 lb 3.2 oz).    Physical Exam:  General: Well-appearing patient, no apparent distress  Cardiac: Regular rate and rhythm, normal S1/S2, no murmur, rubs or gallops, no lower extremity edema  Lungs: Clear to auscultation bilaterally, no crackles or wheezes  Skin: No significant rashes or lesions  MSK: Grossly normal tone and strength  Neuro: Alert and oriented x3, CN II-XII grossly intact  Psych: Appropriate mood and affect    Assessment and Plan:    (F98.8) Attention deficit disorder (ADD) without hyperactivity  Assessment: Overall doing well on current dose without side effects.  Medication greatly improves focus and recall.  Plan:   -Continue dexmethylphenidate XR (Focalin XR) 20 MG 24 hr capsule    (D22.9) Multiple nevi  Assessment: Follows with dermatology annually.  No recently identified abnormal skin findings or biopsies.  Plan:   -Follow-up with dermatology as scheduled    Elevated blood pressure  Assessment: Blood pressure normal on clinic visit.  Elevations typically secondary to caffeine intake.  Blood pressure normal with decreased caffeine intake.  Plan:   -Intermittently monitor home blood pressures  -Cautious with caffeine intake           Patient was given instructions and counseling regarding her " condition or for health maintenance advice. Please see specific information pulled into the AVS if appropriate.       Dr Karel Mattson   Internal Medicine Physician  [facility]  [address], [address]  [place] [address]

## 2023-08-22 NOTE — PATIENT INSTRUCTIONS
Continue current medications as prescribed    Cautious with caffeine intake, intermittently monitor home blood pressure    Follow up in about 3 months for preventative care visit    Encourage healthy diet and exercise

## 2023-09-25 DIAGNOSIS — F98.8 ATTENTION DEFICIT DISORDER (ADD) WITHOUT HYPERACTIVITY: ICD-10-CM

## 2023-09-25 NOTE — TELEPHONE ENCOUNTER
Caller: June Reid    Relationship: Self    Best call back number: [phone]     Requested Prescriptions:   Requested Prescriptions     Pending Prescriptions Disp Refills    dexmethylphenidate XR (Focalin XR) 20 MG 24 hr capsule 30 capsule 0     Sig: Take 1 capsule by mouth Daily for 30 days        Pharmacy where request should be sent: [address] [phone] [facility] [phone] FX     Last office visit with prescribing clinician: 8/22/2023   Last telemedicine visit with prescribing clinician: Visit date not found   Next office visit with prescribing clinician: 11/28/2023     Additional details provided by patient: PATIENT HAS ONE DAY LEFT OF MEDICATION    Does the patient have less than a 3 day supply:  [x] Yes  [] No    Would you like a call back once the refill request has been completed: [] Yes [x] No    If the office needs to give you a call back, can they leave a voicemail: [] Yes [x] No    Steve Conteh   09/25/23 13:05 EDT

## 2023-09-26 RX ORDER — DEXMETHYLPHENIDATE HYDROCHLORIDE 20 MG/1
20 CAPSULE, EXTENDED RELEASE ORAL DAILY
Qty: 30 CAPSULE | Refills: 0 | Status: SHIPPED | OUTPATIENT
Start: 2023-09-26 | End: 2023-10-26

## 2023-10-31 DIAGNOSIS — F98.8 ATTENTION DEFICIT DISORDER (ADD) WITHOUT HYPERACTIVITY: ICD-10-CM

## 2023-10-31 RX ORDER — DEXMETHYLPHENIDATE HYDROCHLORIDE 20 MG/1
20 CAPSULE, EXTENDED RELEASE ORAL DAILY
Qty: 30 CAPSULE | Refills: 0 | Status: SHIPPED | OUTPATIENT
Start: 2023-10-31 | End: 2023-11-30

## 2023-10-31 NOTE — TELEPHONE ENCOUNTER
Caller: June Reid    Relationship: Self    Best call back number: [phone]    Requested Prescriptions:   Requested Prescriptions     Pending Prescriptions Disp Refills    dexmethylphenidate XR (Focalin XR) 20 MG 24 hr capsule 30 capsule 0     Sig: Take 1 capsule by mouth Daily for 30 days        Pharmacy where request should be sent: [address] [phone] [facility] [phone] FX     Last office visit with prescribing clinician: 8/22/2023   Last telemedicine visit with prescribing clinician: Visit date not found   Next office visit with prescribing clinician: 11/28/2023     Additional details provided by patient: IS OUT     Does the patient have less than a 3 day supply:  [x] Yes  [] No    Would you like a call back once the refill request has been completed: [] Yes [x] No    If the office needs to give you a call back, can they leave a voicemail: [] Yes [x] No    Shilpa Mathews   10/31/23 15:50 EDT

## 2023-12-18 DIAGNOSIS — F98.8 ATTENTION DEFICIT DISORDER (ADD) WITHOUT HYPERACTIVITY: ICD-10-CM

## 2023-12-18 NOTE — TELEPHONE ENCOUNTER
Caller: June Reid    Relationship: Self    Best call back number: [phone]     Requested Prescriptions:   Requested Prescriptions     Pending Prescriptions Disp Refills    dexmethylphenidate XR (Focalin XR) 20 MG 24 hr capsule 30 capsule 0     Sig: Take 1 capsule by mouth Daily for 30 days        Pharmacy where request should be sent: [address] [phone] [facility] [phone] FX     Last office visit with prescribing clinician: 8/22/2023   Last telemedicine visit with prescribing clinician: Visit date not found   Next office visit with prescribing clinician: 12/28/2023     Additional details provided by patient: PATIENT HAS 2-3 TABLETS LEFT OF MEDICATION     Does the patient have less than a 3 day supply:  [x] Yes  [] No    Would you like a call back once the refill request has been completed: [] Yes [x] No    If the office needs to give you a call back, can they leave a voicemail: [] Yes [x] No    Steve Verde Rep   12/18/23 10:53 EST

## 2023-12-21 RX ORDER — DEXMETHYLPHENIDATE HYDROCHLORIDE 20 MG/1
20 CAPSULE, EXTENDED RELEASE ORAL DAILY
Qty: 30 CAPSULE | Refills: 0 | Status: SHIPPED | OUTPATIENT
Start: 2023-12-21 | End: 2024-01-20

## 2023-12-28 ENCOUNTER — OFFICE VISIT (OUTPATIENT)
Dept: FAMILY MEDICINE CLINIC | Facility: CLINIC | Age: 38
End: 2023-12-28
Payer: COMMERCIAL

## 2023-12-28 VITALS
RESPIRATION RATE: 18 BRPM | WEIGHT: 193.2 LBS | OXYGEN SATURATION: 98 % | BODY MASS INDEX: 29.28 KG/M2 | SYSTOLIC BLOOD PRESSURE: 104 MMHG | HEIGHT: 68 IN | DIASTOLIC BLOOD PRESSURE: 68 MMHG | HEART RATE: 98 BPM

## 2023-12-28 DIAGNOSIS — Z13.0 SCREENING FOR DEFICIENCY ANEMIA: ICD-10-CM

## 2023-12-28 DIAGNOSIS — L98.9 SKIN LESION: ICD-10-CM

## 2023-12-28 DIAGNOSIS — Z13.1 DIABETES MELLITUS SCREENING: ICD-10-CM

## 2023-12-28 DIAGNOSIS — F98.8 ATTENTION DEFICIT DISORDER (ADD) WITHOUT HYPERACTIVITY: ICD-10-CM

## 2023-12-28 DIAGNOSIS — Z13.220 LIPID SCREENING: ICD-10-CM

## 2023-12-28 DIAGNOSIS — Z00.00 PREVENTATIVE HEALTH CARE: Primary | ICD-10-CM

## 2023-12-28 PROCEDURE — 99395 PREV VISIT EST AGE 18-39: CPT | Performed by: INTERNAL MEDICINE

## 2023-12-28 NOTE — PATIENT INSTRUCTIONS
Please schedule fasting lab appointment     Medications:  Continue current medications as prescribed     Encourage healthy diet and exercise    Due for flu and COVID    Follow up with GYN for mammogram and Pap    Follow up for annual visits or sooner if something arises

## 2023-12-28 NOTE — PROGRESS NOTES
Chief Complaint  Annual Exam    HPI:    June Reid presents to [facility]    Patient presenting for preventative care visit. Patient overall doing well and feels that she has been recently in good health.    ADHD on Focalin XR 20 mg daily. She is overall doing well on the medication.  She has tolerated medication well and does not need current refills.  She noticed significant difference while on the medication versus off.  Sleep overall good as long as she avoids significant caffeine intake.    Mood overall good.     Follows with dermatology for routine skin checks for abnormal nevi.  Most recently completed August 2023.  No recent biopsies or new skin concerns.    Preventative:     Social: Works as , 2 kids     Diet and Exercise: Acknowledges that she should eat healthier and exercise     Alcohol, Tobacco, and Recreational Drug use: Rare alcohol use, no tobacco or recreational drug use     Cancer screenings: Planning on discussing at preventative care visit  Colonoscopy: Due at age 44; no family history of colon cancer  Mammogram: Due at age 39; previously had done one with GYN due to prior breast concern  Pap/HPV: Undergoes routine Pap smears with GYN    Immunizations: Due for flu, COVID booster but would like to postpone as she is due to go on vacation.      Review of Systems:  ROS negative unless otherwise noted in HPI above.    Past Medical History:   Diagnosis Date    ADHD (attention deficit hyperactivity disorder) 2018    Allergic     HL (hearing loss) 1990    Due to ear drum replacement    Kidney stone     Low back pain 2000    Intermittent issues-degenerative disc         Current Outpatient Medications:     dexmethylphenidate XR (Focalin XR) 20 MG 24 hr capsule, Take 1 capsule by mouth Daily for 30 days, Disp: 30 capsule, Rfl: 0    levonorgestrel (MIRENA) 20 MCG/24HR IUD, 1 each by Intrauterine route 1 (One) Time., Disp: , Rfl:     Social History  "    Socioeconomic History    Marital status:    Tobacco Use    Smoking status: Never    Smokeless tobacco: Never   Vaping Use    Vaping Use: Never used   Substance and Sexual Activity    Alcohol use: Yes     Alcohol/week: 1.0 standard drink of alcohol     Types: 1 Glasses of wine per week     Comment: occ    Drug use: Never    Sexual activity: Yes     Partners: Male     Birth control/protection: I.U.D.        Objective   Vital Signs:  /68   Pulse 98   Resp 18   Ht 172.7 cm (68\")   Wt 87.6 kg (193 lb 3.2 oz)   SpO2 98%   BMI 29.38 kg/m²   Estimated body mass index is 29.38 kg/m² as calculated from the following:    Height as of this encounter: 172.7 cm (68\").    Weight as of this encounter: 87.6 kg (193 lb 3.2 oz).    Physical Exam:  General: Well-appearing patient, no apparent distress  HEENT: No posterior pharynx erythema, no tonsillar erythema or exudates, normal external auditory canals, TM normal without bulging or erythema  Neck: No cervical lymphadenopathy  Cardiac: Regular rate and rhythm, normal S1/S2, no murmur, rubs or gallops, no lower extremity edema  Lungs: Clear to auscultation bilaterally, no crackles or wheezes  Abdomen: Soft, non-tender, no guarding or rebound tenderness, no hepatosplenomegaly  Skin: No significant rashes or lesions  MSK: Grossly normal tone and strength  Neuro: Alert and oriented x3, CN II-XII grossly intact  Psych: Appropriate mood and affect    Assessment and Plan:    (Z00.00) Preventative health care  Patient is a 38 year old who is overall doing well. Reviewed social and family history. Encouraged increased healthy diet and exercise and discussed importance to overall health.  Patient to follow-up with GYN to update cancer screenings including Pap smear and mammogram as patient due to turn 39 later this coming year. Discussed indicated vaccines based on age and comorbidities. No skin, mood, or sexual health concerns. P  caitlin:  - Encourage healthy diet and " exercise  -Discussed recommended vaccinations including COVID and flu which patient plans on getting at a later date  - Screening labs as ordered  - Update cancer screening with GYN    (F98.8) Attention deficit disorder (ADD) without hyperactivity -   Assessment: Patient overall doing well on Focalin XR 20 mg daily.  Patient due for routine tox screen.  Plan:   - ToxAssure Flex 14, Urine   -Continue Focalin XR as prescribed    (L98.9) Skin lesion  Assessment: Follows annually for routine skin checks with dermatology.  Plan:  - Follow up with dermatology as scheduled    (Z13.220) Lipid screening - Plan: Lipid panel    (Z13.1) Diabetes mellitus screening - Plan: Glucose, Fasting    (Z13.0) Screening for deficiency anemia - Plan: Hemoglobin     BMI is >= 25 and <30. (Overweight) The following options were offered after discussion;: exercise counseling/recommendations and nutrition counseling/recommendations     Patient was given instructions and counseling regarding her condition or for health maintenance advice. Please see specific information pulled into the AVS if appropriate.       Dr Karel Mattson   Internal Medicine Physician  [facility]  [address], [address]  [place] [address]

## 2023-12-29 ENCOUNTER — LAB (OUTPATIENT)
Dept: FAMILY MEDICINE CLINIC | Facility: CLINIC | Age: 38
End: 2023-12-29
Payer: COMMERCIAL

## 2023-12-29 DIAGNOSIS — Z13.220 LIPID SCREENING: ICD-10-CM

## 2023-12-29 DIAGNOSIS — Z13.1 DIABETES MELLITUS SCREENING: ICD-10-CM

## 2023-12-29 DIAGNOSIS — F98.8 ATTENTION DEFICIT DISORDER (ADD) WITHOUT HYPERACTIVITY: ICD-10-CM

## 2023-12-29 DIAGNOSIS — Z13.0 SCREENING FOR DEFICIENCY ANEMIA: ICD-10-CM

## 2023-12-29 LAB
CHOLEST SERPL-MCNC: 177 MG/DL (ref 0–200)
GLUCOSE P FAST SERPL-MCNC: 90 MG/DL (ref 74–106)
HDLC SERPL-MCNC: 37 MG/DL (ref 40–60)
HGB BLD-MCNC: 14.4 G/DL (ref 12–15.9)
LDLC SERPL CALC-MCNC: 123 MG/DL (ref 0–100)
LDLC/HDLC SERPL: 3.29 {RATIO}
TRIGL SERPL-MCNC: 91 MG/DL (ref 0–150)
VLDLC SERPL-MCNC: 17 MG/DL (ref 5–40)

## 2023-12-29 PROCEDURE — 36415 COLL VENOUS BLD VENIPUNCTURE: CPT

## 2023-12-29 PROCEDURE — G0480 DRUG TEST DEF 1-7 CLASSES: HCPCS | Performed by: INTERNAL MEDICINE

## 2023-12-29 PROCEDURE — 80307 DRUG TEST PRSMV CHEM ANLYZR: CPT | Performed by: INTERNAL MEDICINE

## 2023-12-29 PROCEDURE — 82947 ASSAY GLUCOSE BLOOD QUANT: CPT | Performed by: INTERNAL MEDICINE

## 2023-12-29 PROCEDURE — 80061 LIPID PANEL: CPT | Performed by: INTERNAL MEDICINE

## 2023-12-29 PROCEDURE — 85018 HEMOGLOBIN: CPT | Performed by: INTERNAL MEDICINE

## 2024-01-02 ENCOUNTER — PATIENT ROUNDING (BHMG ONLY) (OUTPATIENT)
Dept: FAMILY MEDICINE CLINIC | Facility: CLINIC | Age: 39
End: 2024-01-02
Payer: COMMERCIAL

## 2024-01-02 LAB
1OH-MIDAZOLAM UR QL SCN: NOT DETECTED NG/MG CREAT
6MAM UR QL SCN: NEGATIVE NG/ML
7AMINOCLONAZEPAM/CREAT UR: NOT DETECTED NG/MG CREAT
A-OH ALPRAZ/CREAT UR: NOT DETECTED NG/MG CREAT
A-OH-TRIAZOLAM/CREAT UR CFM: NOT DETECTED NG/MG CREAT
ALPRAZ/CREAT UR CFM: NOT DETECTED NG/MG CREAT
AMPHET UR CFM-MCNC: NOT DETECTED NG/MG CREAT
AMPHETAMINES UR QL SCN: NORMAL NG/ML
AMPHETAMINES UR QL: NEGATIVE
BARBITURATES UR QL SCN: NEGATIVE NG/ML
BENZODIAZ SCN METH UR: NEGATIVE
BUPRENORPHINE UR QL SCN: NEGATIVE
BUPRENORPHINE/CREAT UR: NOT DETECTED NG/MG CREAT
CLONAZEPAM/CREAT UR CFM: NOT DETECTED NG/MG CREAT
COCAINE+BZE UR QL SCN: NEGATIVE NG/ML
CREAT UR-MCNC: 82 MG/DL
DESALKYLFLURAZ/CREAT UR: NOT DETECTED NG/MG CREAT
DIAZEPAM/CREAT UR: NOT DETECTED NG/MG CREAT
ETHANOL UR QL SCN: NEGATIVE G/DL
FENTANYL CTO UR SCN-MCNC: NEGATIVE NG/ML
FENTANYL/CREAT UR: NOT DETECTED NG/MG CREAT
FLUNITRAZEPAM UR QL SCN: NOT DETECTED NG/MG CREAT
LORAZEPAM/CREAT UR: NOT DETECTED NG/MG CREAT
MDA UR CFM-MCNC: NOT DETECTED NG/MG CREAT
MDMA UR CFM-MCNC: NOT DETECTED NG/MG CREAT
METHADONE UR QL SCN: NEGATIVE NG/ML
METHADONE+METAB UR QL SCN: NEGATIVE NG/ML
METHAMPHET UR CFM-MCNC: NOT DETECTED NG/MG CREAT
MIDAZOLAM/CREAT UR CFM: NOT DETECTED NG/MG CREAT
NORBUPRENORPHINE/CREAT UR: NOT DETECTED NG/MG CREAT
NORDIAZEPAM/CREAT UR: NOT DETECTED NG/MG CREAT
NORFENTANYL/CREAT UR: NOT DETECTED NG/MG CREAT
NORFLUNITRAZEPAM UR-MCNC: NOT DETECTED NG/MG CREAT
OPIATES UR SCN-MCNC: NEGATIVE NG/ML
OXAZEPAM/CREAT UR: NOT DETECTED NG/MG CREAT
OXYCODONE CTO UR SCN-MCNC: NEGATIVE NG/ML
PCP UR QL SCN: NEGATIVE NG/ML
PRESCRIBED MEDICATIONS: NORMAL
PRESCRIBED MEDICATIONS: NORMAL
TAPENTADOL CTO UR SCN-MCNC: NEGATIVE NG/ML
TEMAZEPAM/CREAT UR: NOT DETECTED NG/MG CREAT
TRAMADOL UR QL SCN: NEGATIVE NG/ML

## 2024-01-02 NOTE — PROGRESS NOTES
January 2, 2024      A [employer]fing[employer] message was sent to June Reid inquiring about patient's experience at our office during a recent visit.      DELMY [place]  [facility]  [address]  [place] [id number].

## 2024-01-22 DIAGNOSIS — F98.8 ATTENTION DEFICIT DISORDER (ADD) WITHOUT HYPERACTIVITY: ICD-10-CM

## 2024-01-23 RX ORDER — DEXMETHYLPHENIDATE HYDROCHLORIDE 20 MG/1
20 CAPSULE, EXTENDED RELEASE ORAL DAILY
Qty: 30 CAPSULE | Refills: 0 | Status: SHIPPED | OUTPATIENT
Start: 2024-01-23 | End: 2024-02-22

## 2024-04-09 ENCOUNTER — TELEPHONE (OUTPATIENT)
Dept: FAMILY MEDICINE CLINIC | Facility: CLINIC | Age: 39
End: 2024-04-09

## 2024-08-12 DIAGNOSIS — F98.8 ATTENTION DEFICIT DISORDER (ADD) WITHOUT HYPERACTIVITY: ICD-10-CM

## 2024-08-12 NOTE — TELEPHONE ENCOUNTER
Caller: June Reid    Relationship: Self    Best call back number: [phone] (Mobile)     Requested Prescriptions:   Requested Prescriptions     Pending Prescriptions Disp Refills    dexmethylphenidate XR (Focalin XR) 20 MG 24 hr capsule 30 capsule 0     Sig: Take 1 capsule by mouth Daily for 30 days        Pharmacy where request should be sent: [address] [phone] [facility] [phone] FX     Last office visit with prescribing clinician: 12/28/2023   Last telemedicine visit with prescribing clinician: Visit date not found   Next office visit with prescribing clinician: Visit date not found     Additional details provided by patient:     Does the patient have less than a 3 day supply:  [x] Yes  [] No    Would you like a call back once the refill request has been completed: [] Yes [] No    If the office needs to give you a call back, can they leave a voicemail: [] Yes [] No    Steve Marsh   08/12/24 12:30 EDT

## 2024-08-13 RX ORDER — DEXMETHYLPHENIDATE HYDROCHLORIDE 20 MG/1
20 CAPSULE, EXTENDED RELEASE ORAL DAILY
Qty: 30 CAPSULE | Refills: 0 | Status: SHIPPED | OUTPATIENT
Start: 2024-08-13 | End: 2024-09-12

## 2024-09-23 DIAGNOSIS — F98.8 ATTENTION DEFICIT DISORDER (ADD) WITHOUT HYPERACTIVITY: ICD-10-CM

## 2024-09-24 RX ORDER — DEXMETHYLPHENIDATE HYDROCHLORIDE 20 MG/1
20 CAPSULE, EXTENDED RELEASE ORAL DAILY
Qty: 30 CAPSULE | Refills: 0 | Status: SHIPPED | OUTPATIENT
Start: 2024-09-24 | End: 2024-10-24

## 2024-11-04 DIAGNOSIS — F98.8 ATTENTION DEFICIT DISORDER (ADD) WITHOUT HYPERACTIVITY: ICD-10-CM

## 2024-11-04 NOTE — TELEPHONE ENCOUNTER
Caller: June Reid    Relationship: Self    Best call back number: [phone]     Requested Prescriptions:   Requested Prescriptions     Pending Prescriptions Disp Refills    dexmethylphenidate XR (Focalin XR) 20 MG 24 hr capsule 30 capsule 0     Sig: Take 1 capsule by mouth Daily for 30 days        Pharmacy where request should be sent: [address] [phone] [facility] [phone] FX     Last office visit with prescribing clinician: 12/28/2023   Last telemedicine visit with prescribing clinician: Visit date not found   Next office visit with prescribing clinician: Visit date not found     PATIENT IS OUT OF MEDICATION    Does the patient have less than a 3 day supply:  [x] Yes  [] No    Would you like a call back once the refill request has been completed: [] Yes [] No    If the office needs to give you a call back, can they leave a voicemail: [] Yes [] No    Steve Sargent Rep   11/04/24 12:25 EST

## 2024-11-05 RX ORDER — DEXMETHYLPHENIDATE HYDROCHLORIDE 20 MG/1
20 CAPSULE, EXTENDED RELEASE ORAL DAILY
Qty: 30 CAPSULE | Refills: 0 | Status: SHIPPED | OUTPATIENT
Start: 2024-11-05 | End: 2024-12-05

## 2024-12-06 DIAGNOSIS — F98.8 ATTENTION DEFICIT DISORDER (ADD) WITHOUT HYPERACTIVITY: ICD-10-CM

## 2024-12-06 NOTE — TELEPHONE ENCOUNTER
Caller: June Reid    Relationship: Self    Best call back number: [phone]     Requested Prescriptions:   Requested Prescriptions     Pending Prescriptions Disp Refills    dexmethylphenidate XR (Focalin XR) 20 MG 24 hr capsule 30 capsule 0     Sig: Take 1 capsule by mouth Daily for 30 days        Pharmacy where request should be sent: [address] [phone] [facility] [phone] FX     Last office visit with prescribing clinician: 12/28/2023   Last telemedicine visit with prescribing clinician: Visit date not found   Next office visit with prescribing clinician: Visit date not found     Does the patient have less than a 3 day supply:  [] Yes  [x] No    Steve Hill Rep   12/06/24 14:05 EST

## 2024-12-10 RX ORDER — DEXMETHYLPHENIDATE HYDROCHLORIDE 20 MG/1
20 CAPSULE, EXTENDED RELEASE ORAL DAILY
Qty: 30 CAPSULE | Refills: 0 | Status: SHIPPED | OUTPATIENT
Start: 2024-12-10 | End: 2025-01-09

## 2024-12-11 ENCOUNTER — HOSPITAL ENCOUNTER (EMERGENCY)
Facility: HOSPITAL | Age: 39
Discharge: HOME | End: 2024-12-11
Admitting: UROLOGY
Payer: COMMERCIAL

## 2024-12-11 ENCOUNTER — ANESTHESIA (OUTPATIENT)
Dept: PERIOP | Facility: HOSPITAL | Age: 39
End: 2024-12-11
Payer: COMMERCIAL

## 2024-12-11 ENCOUNTER — OFFICE VISIT (OUTPATIENT)
Dept: FAMILY MEDICINE CLINIC | Facility: CLINIC | Age: 39
End: 2024-12-11
Payer: COMMERCIAL

## 2024-12-11 ENCOUNTER — ANESTHESIA EVENT (OUTPATIENT)
Dept: PERIOP | Facility: HOSPITAL | Age: 39
End: 2024-12-11
Payer: COMMERCIAL

## 2024-12-11 ENCOUNTER — APPOINTMENT (OUTPATIENT)
Dept: CT IMAGING | Facility: HOSPITAL | Age: 39
End: 2024-12-11
Payer: COMMERCIAL

## 2024-12-11 ENCOUNTER — APPOINTMENT (OUTPATIENT)
Dept: GENERAL RADIOLOGY | Facility: HOSPITAL | Age: 39
End: 2024-12-11
Payer: COMMERCIAL

## 2024-12-11 VITALS
RESPIRATION RATE: 12 BRPM | WEIGHT: 194 LBS | DIASTOLIC BLOOD PRESSURE: 90 MMHG | OXYGEN SATURATION: 98 % | SYSTOLIC BLOOD PRESSURE: 126 MMHG | HEART RATE: 70 BPM | HEIGHT: 68 IN | TEMPERATURE: 98.1 F | BODY MASS INDEX: 29.4 KG/M2

## 2024-12-11 VITALS
DIASTOLIC BLOOD PRESSURE: 78 MMHG | WEIGHT: 194.6 LBS | HEART RATE: 86 BPM | OXYGEN SATURATION: 98 % | HEIGHT: 68 IN | RESPIRATION RATE: 18 BRPM | BODY MASS INDEX: 29.49 KG/M2 | SYSTOLIC BLOOD PRESSURE: 134 MMHG

## 2024-12-11 DIAGNOSIS — Z87.442 HISTORY OF KIDNEY STONES: ICD-10-CM

## 2024-12-11 DIAGNOSIS — R10.9 FLANK PAIN: Primary | ICD-10-CM

## 2024-12-11 DIAGNOSIS — R10.9 LEFT LATERAL ABDOMINAL PAIN: Primary | ICD-10-CM

## 2024-12-11 DIAGNOSIS — N20.1 URETEROLITHIASIS: ICD-10-CM

## 2024-12-11 DIAGNOSIS — R10.9 LEFT FLANK PAIN: ICD-10-CM

## 2024-12-11 DIAGNOSIS — N20.0 KIDNEY CALCULI: ICD-10-CM

## 2024-12-11 LAB
ANION GAP SERPL CALCULATED.3IONS-SCNC: 9.3 MMOL/L (ref 5–15)
B-HCG UR QL: NEGATIVE
BACTERIA UR QL AUTO: ABNORMAL /HPF
BASOPHILS # BLD AUTO: 0.05 10*3/MM3 (ref 0–0.2)
BASOPHILS NFR BLD AUTO: 0.4 % (ref 0–1.5)
BILIRUB UR QL STRIP: NEGATIVE
BUN SERPL-MCNC: 17 MG/DL (ref 6–20)
BUN/CREAT SERPL: 17.5 (ref 7–25)
CALCIUM SPEC-SCNC: 9.3 MG/DL (ref 8.6–10.5)
CHLORIDE SERPL-SCNC: 103 MMOL/L (ref 98–107)
CLARITY UR: ABNORMAL
CO2 SERPL-SCNC: 26.7 MMOL/L (ref 22–29)
COLOR UR: YELLOW
CREAT SERPL-MCNC: 0.97 MG/DL (ref 0.57–1)
DEPRECATED RDW RBC AUTO: 38.1 FL (ref 37–54)
EGFRCR SERPLBLD CKD-EPI 2021: 75.9 ML/MIN/1.73
EOSINOPHIL # BLD AUTO: 0.1 10*3/MM3 (ref 0–0.4)
EOSINOPHIL NFR BLD AUTO: 0.9 % (ref 0.3–6.2)
ERYTHROCYTE [DISTWIDTH] IN BLOOD BY AUTOMATED COUNT: 11.9 % (ref 12.3–15.4)
GLUCOSE SERPL-MCNC: 100 MG/DL (ref 65–99)
GLUCOSE UR STRIP-MCNC: NEGATIVE MG/DL
HCT VFR BLD AUTO: 42.1 % (ref 34–46.6)
HGB BLD-MCNC: 14.2 G/DL (ref 12–15.9)
HGB UR QL STRIP.AUTO: ABNORMAL
HYALINE CASTS UR QL AUTO: ABNORMAL /LPF
IMM GRANULOCYTES # BLD AUTO: 0.07 10*3/MM3 (ref 0–0.05)
IMM GRANULOCYTES NFR BLD AUTO: 0.6 % (ref 0–0.5)
KETONES UR QL STRIP: NEGATIVE
LEUKOCYTE ESTERASE UR QL STRIP.AUTO: ABNORMAL
LYMPHOCYTES # BLD AUTO: 1.8 10*3/MM3 (ref 0.7–3.1)
LYMPHOCYTES NFR BLD AUTO: 15.8 % (ref 19.6–45.3)
MCH RBC QN AUTO: 29.6 PG (ref 26.6–33)
MCHC RBC AUTO-ENTMCNC: 33.7 G/DL (ref 31.5–35.7)
MCV RBC AUTO: 87.9 FL (ref 79–97)
MONOCYTES # BLD AUTO: 1.16 10*3/MM3 (ref 0.1–0.9)
MONOCYTES NFR BLD AUTO: 10.2 % (ref 5–12)
NEUTROPHILS NFR BLD AUTO: 72.1 % (ref 42.7–76)
NEUTROPHILS NFR BLD AUTO: 8.19 10*3/MM3 (ref 1.7–7)
NITRITE UR QL STRIP: NEGATIVE
NRBC BLD AUTO-RTO: 0 /100 WBC (ref 0–0.2)
PH UR STRIP.AUTO: 7.5 [PH] (ref 5–8)
PLATELET # BLD AUTO: 350 10*3/MM3 (ref 140–450)
PMV BLD AUTO: 9.6 FL (ref 6–12)
POTASSIUM SERPL-SCNC: 3.8 MMOL/L (ref 3.5–5.2)
PROT UR QL STRIP: ABNORMAL
RBC # BLD AUTO: 4.79 10*6/MM3 (ref 3.77–5.28)
RBC # UR STRIP: ABNORMAL /HPF
REF LAB TEST METHOD: ABNORMAL
SODIUM SERPL-SCNC: 139 MMOL/L (ref 136–145)
SP GR UR STRIP: 1.01 (ref 1–1.03)
SQUAMOUS #/AREA URNS HPF: ABNORMAL /HPF
UROBILINOGEN UR QL STRIP: ABNORMAL
WBC # UR STRIP: ABNORMAL /HPF
WBC NRBC COR # BLD AUTO: 11.37 10*3/MM3 (ref 3.4–10.8)

## 2024-12-11 PROCEDURE — 25010000002 PROPOFOL 200 MG/20ML EMULSION: Performed by: NURSE ANESTHETIST, CERTIFIED REGISTERED

## 2024-12-11 PROCEDURE — 25010000002 LIDOCAINE PF 2% 2 % SOLUTION: Performed by: NURSE ANESTHETIST, CERTIFIED REGISTERED

## 2024-12-11 PROCEDURE — 25010000002 FENTANYL CITRATE (PF) 100 MCG/2ML SOLUTION: Performed by: NURSE ANESTHETIST, CERTIFIED REGISTERED

## 2024-12-11 PROCEDURE — 76000 FLUOROSCOPY <1 HR PHYS/QHP: CPT

## 2024-12-11 PROCEDURE — 81001 URINALYSIS AUTO W/SCOPE: CPT | Performed by: NURSE PRACTITIONER

## 2024-12-11 PROCEDURE — C1758 CATHETER, URETERAL: HCPCS | Performed by: UROLOGY

## 2024-12-11 PROCEDURE — 87086 URINE CULTURE/COLONY COUNT: CPT | Performed by: NURSE PRACTITIONER

## 2024-12-11 PROCEDURE — 80048 BASIC METABOLIC PNL TOTAL CA: CPT | Performed by: NURSE PRACTITIONER

## 2024-12-11 PROCEDURE — C1713 ANCHOR/SCREW BN/BN,TIS/BN: HCPCS | Performed by: UROLOGY

## 2024-12-11 PROCEDURE — 82365 CALCULUS SPECTROSCOPY: CPT | Performed by: UROLOGY

## 2024-12-11 PROCEDURE — 81025 URINE PREGNANCY TEST: CPT | Performed by: NURSE PRACTITIONER

## 2024-12-11 PROCEDURE — 99285 EMERGENCY DEPT VISIT HI MDM: CPT

## 2024-12-11 PROCEDURE — 25810000003 LACTATED RINGERS PER 1000 ML: Performed by: NURSE ANESTHETIST, CERTIFIED REGISTERED

## 2024-12-11 PROCEDURE — C1769 GUIDE WIRE: HCPCS | Performed by: UROLOGY

## 2024-12-11 PROCEDURE — 74176 CT ABD & PELVIS W/O CONTRAST: CPT

## 2024-12-11 PROCEDURE — 99214 OFFICE O/P EST MOD 30 MIN: CPT | Performed by: INTERNAL MEDICINE

## 2024-12-11 PROCEDURE — 25810000003 LACTATED RINGERS PER 1000 ML: Performed by: UROLOGY

## 2024-12-11 PROCEDURE — 25010000002 CEFEPIME PER 500 MG: Performed by: NURSE PRACTITIONER

## 2024-12-11 PROCEDURE — C2617 STENT, NON-COR, TEM W/O DEL: HCPCS | Performed by: UROLOGY

## 2024-12-11 PROCEDURE — 85025 COMPLETE CBC W/AUTO DIFF WBC: CPT | Performed by: NURSE PRACTITIONER

## 2024-12-11 DEVICE — URETERAL STENT
Type: IMPLANTABLE DEVICE | Site: URETER | Status: FUNCTIONAL
Brand: PERCUFLEX™ PLUS

## 2024-12-11 RX ORDER — PROMETHAZINE HYDROCHLORIDE 25 MG/1
25 TABLET ORAL ONCE AS NEEDED
Status: DISCONTINUED | OUTPATIENT
Start: 2024-12-11 | End: 2024-12-11 | Stop reason: HOSPADM

## 2024-12-11 RX ORDER — OXYBUTYNIN CHLORIDE 5 MG/1
5 TABLET ORAL 3 TIMES DAILY PRN
Status: CANCELLED | OUTPATIENT
Start: 2024-12-11

## 2024-12-11 RX ORDER — SODIUM CHLORIDE, SODIUM LACTATE, POTASSIUM CHLORIDE, CALCIUM CHLORIDE 600; 310; 30; 20 MG/100ML; MG/100ML; MG/100ML; MG/100ML
INJECTION, SOLUTION INTRAVENOUS CONTINUOUS PRN
Status: DISCONTINUED | OUTPATIENT
Start: 2024-12-11 | End: 2024-12-11 | Stop reason: SURG

## 2024-12-11 RX ORDER — SODIUM CHLORIDE 0.9 % (FLUSH) 0.9 %
10 SYRINGE (ML) INJECTION AS NEEDED
Status: DISCONTINUED | OUTPATIENT
Start: 2024-12-11 | End: 2024-12-11 | Stop reason: HOSPADM

## 2024-12-11 RX ORDER — LIDOCAINE HYDROCHLORIDE 20 MG/ML
INJECTION, SOLUTION EPIDURAL; INFILTRATION; INTRACAUDAL; PERINEURAL AS NEEDED
Status: DISCONTINUED | OUTPATIENT
Start: 2024-12-11 | End: 2024-12-11 | Stop reason: SURG

## 2024-12-11 RX ORDER — PROPOFOL 10 MG/ML
INJECTION, EMULSION INTRAVENOUS AS NEEDED
Status: DISCONTINUED | OUTPATIENT
Start: 2024-12-11 | End: 2024-12-11 | Stop reason: SURG

## 2024-12-11 RX ORDER — PHENAZOPYRIDINE HYDROCHLORIDE 100 MG/1
100 TABLET, FILM COATED ORAL 3 TIMES DAILY PRN
Qty: 15 TABLET | Refills: 0 | Status: SHIPPED | OUTPATIENT
Start: 2024-12-11

## 2024-12-11 RX ORDER — ACETAMINOPHEN 325 MG/1
650 TABLET ORAL ONCE AS NEEDED
Status: DISCONTINUED | OUTPATIENT
Start: 2024-12-11 | End: 2024-12-11 | Stop reason: HOSPADM

## 2024-12-11 RX ORDER — CEPHALEXIN 500 MG/1
500 CAPSULE ORAL 2 TIMES DAILY
Qty: 10 CAPSULE | Refills: 0 | Status: SHIPPED | OUTPATIENT
Start: 2024-12-11 | End: 2024-12-16

## 2024-12-11 RX ORDER — DOCUSATE SODIUM 100 MG/1
100 CAPSULE, LIQUID FILLED ORAL 2 TIMES DAILY PRN
Qty: 30 CAPSULE | Refills: 1 | Status: SHIPPED | OUTPATIENT
Start: 2024-12-11

## 2024-12-11 RX ORDER — SODIUM CHLORIDE 9 MG/ML
INJECTION, SOLUTION INTRAVENOUS CONTINUOUS PRN
Status: DISCONTINUED | OUTPATIENT
Start: 2024-12-11 | End: 2024-12-11

## 2024-12-11 RX ORDER — OXYBUTYNIN CHLORIDE 5 MG/1
5 TABLET ORAL 3 TIMES DAILY PRN
Qty: 30 TABLET | Refills: 0 | Status: SHIPPED | OUTPATIENT
Start: 2024-12-11

## 2024-12-11 RX ORDER — KETOROLAC TROMETHAMINE 10 MG/1
10 TABLET, FILM COATED ORAL EVERY 6 HOURS PRN
Qty: 10 TABLET | Refills: 0 | Status: SHIPPED | OUTPATIENT
Start: 2024-12-11

## 2024-12-11 RX ORDER — PROCHLORPERAZINE EDISYLATE 5 MG/ML
10 INJECTION INTRAMUSCULAR; INTRAVENOUS ONCE AS NEEDED
Status: DISCONTINUED | OUTPATIENT
Start: 2024-12-11 | End: 2024-12-11 | Stop reason: HOSPADM

## 2024-12-11 RX ORDER — OXYCODONE AND ACETAMINOPHEN 5; 325 MG/1; MG/1
1-2 TABLET ORAL EVERY 6 HOURS PRN
Qty: 15 TABLET | Refills: 0 | Status: SHIPPED | OUTPATIENT
Start: 2024-12-11

## 2024-12-11 RX ORDER — SODIUM CHLORIDE, SODIUM LACTATE, POTASSIUM CHLORIDE, CALCIUM CHLORIDE 600; 310; 30; 20 MG/100ML; MG/100ML; MG/100ML; MG/100ML
1000 INJECTION, SOLUTION INTRAVENOUS ONCE
Status: COMPLETED | OUTPATIENT
Start: 2024-12-11 | End: 2024-12-11

## 2024-12-11 RX ORDER — PHENAZOPYRIDINE HYDROCHLORIDE 100 MG/1
100 TABLET, FILM COATED ORAL 3 TIMES DAILY PRN
Status: CANCELLED | OUTPATIENT
Start: 2024-12-11

## 2024-12-11 RX ORDER — ONDANSETRON 4 MG/1
4 TABLET, ORALLY DISINTEGRATING ORAL EVERY 8 HOURS PRN
Qty: 15 TABLET | Refills: 1 | Status: SHIPPED | OUTPATIENT
Start: 2024-12-11

## 2024-12-11 RX ORDER — PROMETHAZINE HYDROCHLORIDE 25 MG/1
25 SUPPOSITORY RECTAL ONCE AS NEEDED
Status: DISCONTINUED | OUTPATIENT
Start: 2024-12-11 | End: 2024-12-11 | Stop reason: HOSPADM

## 2024-12-11 RX ORDER — FENTANYL CITRATE 50 UG/ML
100 INJECTION, SOLUTION INTRAMUSCULAR; INTRAVENOUS
Status: DISCONTINUED | OUTPATIENT
Start: 2024-12-11 | End: 2024-12-11 | Stop reason: HOSPADM

## 2024-12-11 RX ORDER — LIDOCAINE HYDROCHLORIDE 10 MG/ML
0.5 INJECTION, SOLUTION EPIDURAL; INFILTRATION; INTRACAUDAL; PERINEURAL ONCE AS NEEDED
Status: DISCONTINUED | OUTPATIENT
Start: 2024-12-11 | End: 2024-12-11 | Stop reason: HOSPADM

## 2024-12-11 RX ORDER — OXYCODONE HYDROCHLORIDE 5 MG/1
10 TABLET ORAL ONCE AS NEEDED
Status: DISCONTINUED | OUTPATIENT
Start: 2024-12-11 | End: 2024-12-11 | Stop reason: HOSPADM

## 2024-12-11 RX ORDER — FENTANYL CITRATE 50 UG/ML
INJECTION, SOLUTION INTRAMUSCULAR; INTRAVENOUS AS NEEDED
Status: DISCONTINUED | OUTPATIENT
Start: 2024-12-11 | End: 2024-12-11 | Stop reason: SURG

## 2024-12-11 RX ORDER — ONDANSETRON 2 MG/ML
4 INJECTION INTRAMUSCULAR; INTRAVENOUS ONCE AS NEEDED
Status: DISCONTINUED | OUTPATIENT
Start: 2024-12-11 | End: 2024-12-11 | Stop reason: HOSPADM

## 2024-12-11 RX ADMIN — FENTANYL CITRATE 50 MCG: 50 INJECTION, SOLUTION INTRAMUSCULAR; INTRAVENOUS at 16:12

## 2024-12-11 RX ADMIN — CEFEPIME 2000 MG: 2 INJECTION, POWDER, FOR SOLUTION INTRAVENOUS at 16:01

## 2024-12-11 RX ADMIN — PROPOFOL 200 MG: 10 INJECTION, EMULSION INTRAVENOUS at 16:12

## 2024-12-11 RX ADMIN — SODIUM CHLORIDE, SODIUM LACTATE, POTASSIUM CHLORIDE, AND CALCIUM CHLORIDE: .6; .31; .03; .02 INJECTION, SOLUTION INTRAVENOUS at 16:09

## 2024-12-11 RX ADMIN — LIDOCAINE HYDROCHLORIDE 100 MG: 20 SOLUTION INTRAVENOUS at 16:12

## 2024-12-11 RX ADMIN — FENTANYL CITRATE 50 MCG: 50 INJECTION, SOLUTION INTRAMUSCULAR; INTRAVENOUS at 16:20

## 2024-12-11 RX ADMIN — SODIUM CHLORIDE, POTASSIUM CHLORIDE, SODIUM LACTATE AND CALCIUM CHLORIDE 1000 ML: 600; 310; 30; 20 INJECTION, SOLUTION INTRAVENOUS at 14:14

## 2024-12-11 NOTE — ANESTHESIA POSTPROCEDURE EVALUATION
Patient: June Reid    Procedure Summary       Date: 12/11/24 Room / Location: [facility] OR 06 / [facility] MAIN OR    Anesthesia Start: 1609 Anesthesia Stop: 1647    Procedure: URETEROSCOPY LASER LITHOTRIPSY WITH STENT INSERTION (Left) Diagnosis:     Surgeons: Soren Barker MD Provider: Zeus Iyer MD    Anesthesia Type: general ASA Status: 2            Anesthesia Type: general    Vitals  Vitals Value Taken Time   /96 12/11/24 1726   Temp 97.7 °F (36.5 °C) 12/11/24 1726   Pulse 70 12/11/24 1728   Resp 13 12/11/24 1726   SpO2 100 % 12/11/24 1728   Vitals shown include unfiled device data.        Post Anesthesia Care and Evaluation    Patient location during evaluation: PACU  Patient participation: complete - patient participated  Level of consciousness: awake  Pain scale: See nurse's notes for pain score.  Pain management: adequate    Airway patency: patent  Anesthetic complications: No anesthetic complications  PONV Status: none  Cardiovascular status: acceptable  Respiratory status: acceptable and spontaneous ventilation  Hydration status: acceptable    Comments: Patient seen and examined postoperatively; vital signs stable; SpO2 greater than or equal to 90%; cardiopulmonary status stable; nausea/vomiting adequately controlled; pain adequately controlled; no apparent anesthesia complications; patient discharged from anesthesia care when discharge criteria were met

## 2024-12-11 NOTE — OP NOTE
Urology Operative Note    12/11/2024    June Reid  39 y.o.  1985  female  [id number]      Surgeon(s) and Role:  Soren Barker MD - Primary     Pre-operative Diagnosis: 9 mm left distal ureteral stone    Post-operative Diagnosis: Same    Complications: None    Procedures:  Cystoscopy  Left ureteroscopy  Laser Lithotripsy  Basket-extraction of stone fragments  Stent placement  Fluoroscopy with Interpretation     Indications   June Reid is a 39 y.o. female who was found to have 9 mm left distal ureteral stone admitted and added on for intervention    During the informed consent process, the procedure was discussed in detail including the risks of bleeding, infection, damage to surrounding structures and need for staged procedure.      Findings     Fluoroscopy with interpretation: Wire passed to the kidney.  Stent placed up the ureter    Description of procedure:  The patient was properly identified in the preoperative holding area and taken to the operating room where general anesthesia was induced. The patient was placed in the cystolithotomy position and prepped and draped in a sterile fashion. The patient was given antibiotics intravenously before the start of the surgery. After ensuring that all of the required equipment was ready and available a surgical timeout was performed.     A 21 Belarusian rigid cystoscope was inserted into the bladder under direct visualization.   A Sensor wire was passed up the ureter under fluoroscopic guidance.  Dual-lumen used to dilate the UVJ  Semirigid ureteroscope was passed into the ureter. The stone was visualized. The stone was lasered into tiny fragments.   Basket was used to clear any remaining fragments.   No more stones were seen within the ureter under direct visualization.   The cystoscope was then replaced over the wire and a 6 Belarusian x 26cm stent was placed under direct and fluoroscopic visualization.  The bladder was then emptied and scope  removed.    There were no apparent complications. The patient woke up in the operating room and was taken to the recovery room in stable condition.     I was present and scrubbed for the entire procedure.     Specimens: Stone    Estimated Blood Loss: minimal      Plan   -Follow up with Dr. Barker next week for stent removal  Rx sent-okay for discharge today         Soren Barker MD  [facility]  [facility]  [place] [address] [phone]

## 2024-12-11 NOTE — PATIENT INSTRUCTIONS
Patient to go to [facility]    Follow up for preventative care visit in 3 month

## 2024-12-11 NOTE — ED PROVIDER NOTES
Subjective   History of Present Illness  Chief complaint: abd pain, flank pain      Context: Patient is a 39-year-old female who presents with her significant other from urgent care with complaints of left lower quadrant pain and flank pain.  She denies any fever or upper respiratory complaints vomiting or diarrhea.  She states she had a kidney stone that was nonsurgical in nature approximately 8 years ago.  No dysuria or urinary symptoms.  She states her urine has been discolored although it is noted that she is on her menstrual cycle.  She was seen by urgent care who sent a urine culture and a prescription for antibiotics today.  No prior colonoscopy or history of diverticulitis        PCP: dolezal  Lmp: iud, on menstrual cycle      Review of Systems   Constitutional:  Negative for fever.       Past Medical History:   Diagnosis Date    ADHD (attention deficit hyperactivity disorder) 2018    Allergic     HL (hearing loss) 1990    Due to ear drum replacement    Kidney stone     Low back pain 2000    Intermittent issues-degenerative disc       Allergies   Allergen Reactions    Cefaclor Rash       Past Surgical History:   Procedure Laterality Date    TYMPANOPLASTY         Family History   Problem Relation Age of Onset    Cancer Mother     Lung cancer Mother     Asthma Mother     Hypertension Father     Hyperlipidemia Father     COPD Maternal Grandmother     No Known Problems Maternal Grandfather     Heart failure Paternal Grandmother     Diabetes Paternal Grandmother     Heart disease Paternal Grandmother     Heart disease Paternal Grandfather     Stroke Paternal Grandfather        Social History     Socioeconomic History    Marital status:    Tobacco Use    Smoking status: Never    Smokeless tobacco: Never   Vaping Use    Vaping status: Never Used   Substance and Sexual Activity    Alcohol use: Yes     Alcohol/week: 1.0 standard drink of alcohol     Types: 1 Glasses of wine per week     Comment: occ    Drug  use: Never    Sexual activity: Yes     Partners: Male     Birth control/protection: I.U.D.           Objective   Physical Exam    Due to significant overcrowding in the emergency department patient was evaluated by myself in a hallway bed.  Explained to the patient our limitations due to overcrowding and they were in agreement to continue exam and treatment at this time.     Vital signs and triage nurse note reviewed.  Constitutional: Awake, alert; well-developed and well-nourished. No acute distress is noted.  Nontoxic in appearance.  Spouse at bedside  HEENT: Normocephalic, atraumatic;  with intact EOM; oropharynx is pink and moist without exudate or erythema.  Neck: Supple,   Cardiovascular: Regular rate and rhythm, normal S1-S2.  Pulmonary: Respiratory effort regular nonlabored, breath sounds clear to auscultation all fields.  Abdomen: Soft, minor left lower quadrant pain without any peritonitis nondistended with normoactive bowel sounds; no rebound or guarding.  No CVAT  Musculoskeletal: Independent range of motion of all extremities with no palpable tenderness or edema.  Neuro: Alert oriented x3, speech is clear and appropriate, GCS 15  Skin:  Fleshtone warm, dry, intact; no erythematous or petechial rash or lesion      Procedures           ED Course  ED Course as of 12/11/24 1436   Wed Dec 11, 2024   1300 Spoke with [facility]   [JW]      ED Course User Index  [JW] Vannesa Campbell APRN                                           Labs Reviewed   BASIC METABOLIC PANEL - Abnormal; Notable for the following components:       Result Value    Glucose 100 (*)     All other components within normal limits    Narrative:     GFR Categories in Chronic Kidney Disease (CKD)      GFR Category          GFR (mL/min/1.73)    Interpretation  G1                     90 or greater         Normal or high (1)  G2                      60-89                Mild decrease (1)  G3a                   45-59                Mild to  moderate decrease  G3b                   30-44                Moderate to severe decrease  G4                    15-29                Severe decrease  G5                    14 or less           Kidney failure          (1)In the absence of evidence of kidney disease, neither GFR category G1 or G2 fulfill the criteria for CKD.    eGFR calculation 2021 CKD-EPI creatinine equation, which does not include race as a factor   CBC WITH AUTO DIFFERENTIAL - Abnormal; Notable for the following components:    WBC 11.37 (*)     RDW 11.9 (*)     Lymphocyte % 15.8 (*)     Immature Grans % 0.6 (*)     Neutrophils, Absolute 8.19 (*)     Monocytes, Absolute 1.16 (*)     Immature Grans, Absolute 0.07 (*)     All other components within normal limits   URINALYSIS W/ CULTURE IF INDICATED - Abnormal; Notable for the following components:    Appearance, UA Cloudy (*)     Blood, UA Large (3+) (*)     Protein, UA 30 mg/dL (1+) (*)     Leuk Esterase, UA Trace (*)     All other components within normal limits    Narrative:     In absence of clinical symptoms, the presence of pyuria, bacteria, and/or nitrites on the urinalysis result does not correlate with infection.   URINALYSIS, MICROSCOPIC ONLY - Abnormal; Notable for the following components:    RBC, UA Too Numerous to Count (*)     WBC, UA 6-10 (*)     All other components within normal limits   PREGNANCY, URINE - Normal   URINE CULTURE   URINALYSIS W/ CULTURE IF INDICATED   CBC AND DIFFERENTIAL    Narrative:     The following orders were created for panel order CBC & Differential.  Procedure                               Abnormality         Status                     ---------                               -----------         ------                     CBC Auto Differential[[id number]]        Abnormal            Final result                 Please view results for these tests on the individual orders.     Medications   cefepime 2000 mg IVPB in 100 mL NS (MBP) (has no administration in  "time range)   lidocaine PF 1% (XYLOCAINE) injection 0.5 mL (has no administration in time range)   sodium chloride 0.9 % flush 10 mL (has no administration in time range)   lactated ringers infusion 1,000 mL (1,000 mL Intravenous New Bag 12/11/24 1414)       CT Abdomen Pelvis Stone Protocol    Result Date: 12/11/2024  Impression: 1. 9 x 5 mm obstructing stone in the distal third of left ureter with mild to moderate left hydronephrosis and hydroureter. 2. Tiny nonobstructing left renal stone Electronically Signed: Nany Marina MD  12/11/2024 12:48 PM EST  Workstation ID: [id number]   Prior to Admission medications    Medication Sig Start Date End Date Taking? Authorizing Provider   dexmethylphenidate XR (Focalin XR) 20 MG 24 hr capsule Take 1 capsule by mouth Daily for 30 days 12/10/24 1/9/25  Karel Mattson MD   levonorgestrel (MIRENA) 20 MCG/24HR IUD 1 each by Intrauterine route 1 (One) Time.  12/11/24  ProviderEvi MD                 Medical Decision Making      /99 (BP Location: Left arm, Patient Position: Sitting)   Pulse 86   Temp 97.8 °F (36.6 °C) (Oral)   Resp 17   Ht 172.7 cm (68\")   Wt 88 kg (194 lb 0.1 oz)   SpO2 99%   BMI 29.50 kg/m²      Chart review: 12/11/2024 urgent care note patient was sent to ER for further evaluation, urine cultures pending    Radiology interpretation:  ct reviewed and interpreted by jorge:  Further interpretation by radiologist as above  Lab interpretation:  Labs all viewed by me and significant for, refused urine cath, pregnancy test negative-urine and culture was done at urgent care today.  Glucose 100, white count 11.3         Appropriate PPE worn during exam.  Patient was seen in [facility] bed due to overcrowding and census.  She initially refused any pain medications.  She had CT labs obtained to evaluate for diverticulitis UTI kidney stone.  She had a voided specimen but is currently on her menstrual cycle.  Urine cultures pending.  I discussed with " Apryl with urology with plans for stent placement today.  On reexam patient is resting comfortably.  i discussed findings with patient who voices understanding of transfer to the OR.    Discussed with Dr. Tello    Amount and/or Complexity of Data Reviewed  Labs: ordered.  Radiology: ordered.    Risk  Decision regarding hospitalization.        Final diagnoses:   Flank pain   Ureterolithiasis       ED Disposition  ED Disposition       ED Disposition   Decision to Admit    Condition   --    Comment   --               No follow-up provider specified.       Medication List      No changes were made to your prescriptions during this visit.            Vannesa Campbell, APRN  12/11/24 3916

## 2024-12-11 NOTE — CONSULTS
[facility] CONSULT      Patient Identification:  NAME:  June Reid  Age:  39 y.o.   Sex:  female   :  1985   MRN:  [id number]       Chief complaint/Reason for consult: Obstructing left distal ureteral stone     History of present illness:  39 y.o. female with known history of renal calculi. Presented to ER on 24 with complaints of left lower quadrant and flank pain. Denies dysuria,fevers, chills, nausea or vomiting. Reports discolored urine, however didn't is currently on menstrual cycle.      Past medical history:  Past Medical History:   Diagnosis Date    ADHD (attention deficit hyperactivity disorder)     Allergic     HL (hearing loss)     Due to ear drum replacement    Kidney stone     Low back pain     Intermittent issues-degenerative disc       Past surgical history:  Past Surgical History:   Procedure Laterality Date    TYMPANOPLASTY         Allergies:  Cefaclor    Home medications:  (Not in a hospital admission)       Hospital medications:  cefepime, 2,000 mg, Intravenous, Once             Family history:  Family History   Problem Relation Age of Onset    Cancer Mother     Lung cancer Mother     Asthma Mother     Hypertension Father     Hyperlipidemia Father     COPD Maternal Grandmother     No Known Problems Maternal Grandfather     Heart failure Paternal Grandmother     Diabetes Paternal Grandmother     Heart disease Paternal Grandmother     Heart disease Paternal Grandfather     Stroke Paternal Grandfather        Social history:  Social History     Tobacco Use    Smoking status: Never    Smokeless tobacco: Never   Vaping Use    Vaping status: Never Used   Substance Use Topics    Alcohol use: Yes     Alcohol/week: 1.0 standard drink of alcohol     Types: 1 Glasses of wine per week     Comment: occ    Drug use: Never       Objective:  TMax 24 hours:   Temp (24hrs), Av.8 °F (36.6 °C), Min:97.8 °F (36.6 °C), Max:97.8 °F (36.6 °C)      Vitals Ranges:   Temp:   [97.8 °F (36.6 °C)] 97.8 °F (36.6 °C)  Heart Rate:  [86-88] 88  Resp:  [16-18] 16  BP: (134-151)/(70-99) 142/70    Intake/Output Last 3 shifts:  No intake/output data recorded.     Physical Exam:    General Appearance:    Alert, cooperative, NAD   Lungs:     Respirations unlabored, no audible wheezing    Heart:    No cyanosis   Abdomen:     Soft, ND    :    No suprapubic distention           Results review:   I reviewed the patient's new clinical results.    Data review:  Lab Results (last 24 hours)       Procedure Component Value Units Date/Time    Urinalysis, Microscopic Only - Urine, Clean Catch [[id number]] Collected: 12/11/24 1157    Specimen: Urine, Clean Catch Updated: 12/11/24 1320    Urinalysis With Culture If Indicated - Urine, Clean Catch [[id number]] Collected: 12/11/24 1157    Specimen: Urine, Clean Catch Updated: 12/11/24 1314    Basic Metabolic Panel [[id number]]  (Abnormal) Collected: 12/11/24 1157    Specimen: Blood Updated: 12/11/24 1224     Glucose 100 mg/dL      BUN 17 mg/dL      Creatinine 0.97 mg/dL      Sodium 139 mmol/L      Potassium 3.8 mmol/L      Comment: Specimen hemolyzed.  Result may be falsely elevated.        Chloride 103 mmol/L      CO2 26.7 mmol/L      Calcium 9.3 mg/dL      BUN/Creatinine Ratio 17.5     Anion Gap 9.3 mmol/L      eGFR 75.9 mL/min/1.73     Narrative:      GFR Categories in Chronic Kidney Disease (CKD)      GFR Category          GFR (mL/min/1.73)    Interpretation  G1                     90 or greater         Normal or high (1)  G2                      60-89                Mild decrease (1)  G3a                   45-59                Mild to moderate decrease  G3b                   30-44                Moderate to severe decrease  G4                    15-29                Severe decrease  G5                    14 or less           Kidney failure          (1)In the absence of evidence of kidney disease, neither GFR category G1 or G2 fulfill the criteria for  CKD.    eGFR calculation 2021 CKD-EPI creatinine equation, which does not include race as a factor    Pregnancy, Urine - Urine, Clean Catch [[id number]]  (Normal) Collected: 12/11/24 1157    Specimen: Urine, Clean Catch Updated: 12/11/24 1208     HCG, Urine QL Negative    CBC & Differential [[id number]]  (Abnormal) Collected: 12/11/24 1157    Specimen: Blood Updated: 12/11/24 1203    Narrative:      The following orders were created for panel order CBC & Differential.  Procedure                               Abnormality         Status                     ---------                               -----------         ------                     CBC Auto Differential[[id number]]        Abnormal            Final result                 Please view results for these tests on the individual orders.    CBC Auto Differential [[id number]]  (Abnormal) Collected: 12/11/24 1157    Specimen: Blood Updated: 12/11/24 1203     WBC 11.37 10*3/mm3      RBC 4.79 10*6/mm3      Hemoglobin 14.2 g/dL      Hematocrit 42.1 %      MCV 87.9 fL      MCH 29.6 pg      MCHC 33.7 g/dL      RDW 11.9 %      RDW-SD 38.1 fl      MPV 9.6 fL      Platelets 350 10*3/mm3      Neutrophil % 72.1 %      Lymphocyte % 15.8 %      Monocyte % 10.2 %      Eosinophil % 0.9 %      Basophil % 0.4 %      Immature Grans % 0.6 %      Neutrophils, Absolute 8.19 10*3/mm3      Lymphocytes, Absolute 1.80 10*3/mm3      Monocytes, Absolute 1.16 10*3/mm3      Eosinophils, Absolute 0.10 10*3/mm3      Basophils, Absolute 0.05 10*3/mm3      Immature Grans, Absolute 0.07 10*3/mm3      nRBC 0.0 /100 WBC              Imaging:  Imaging Results (Last 24 Hours)       Procedure Component Value Units Date/Time    CT Abdomen Pelvis Stone Protocol [[id number]] Collected: 12/11/24 1246     Updated: 12/11/24 1250    Narrative:      CT ABDOMEN PELVIS STONE PROTOCOL    Date of Exam: 12/11/2024 12:41 PM EST    Indication: llq pain, hx stones.    Comparison: None available.    Technique: Axial CT images  were obtained of the abdomen and pelvis without the administration of contrast. Sagittal and coronal reconstructions were performed.  Automated exposure control and iterative reconstruction methods were used.      Findings:  9 x 5 mm obstructing stone in the distal third left ureter with resulting mild to moderate left hydronephrosis and hydroureter. Punctate nonobstructing stone is seen in the left lower renal pole. No right renal stone is evident.    Noncontrast appearance of the liver, gallbladder, spleen, pancreas and adrenals is within normal limits.    The appendix is normal. The stomach, large and small bowel, do not appear abnormally thickened, dilated or inflamed.    Urinary bladder and rectum are normal. Intrauterine device is in place. No pelvic free fluid is identified.    No acute or suspicious osseous abnormalities.    Lung bases are clear. Heart size is within the limits.      Impression:      Impression:    1. 9 x 5 mm obstructing stone in the distal third of left ureter with mild to moderate left hydronephrosis and hydroureter.  2. Tiny nonobstructing left renal stone            Electronically Signed: Nany Marina MD    12/11/2024 12:48 PM EST    Workstation ID: [id number]               Assessment:       * No active hospital problems. *      9x5 obstructing left distal ureteral stone.    Plan:     Plan:   -CT images reviewed with obstructing left distal ureteral stone and associated hydronephrosis present.  - UA reviewed- neg  - CBC reviewed slightly elevated WBC 11.37.  -Discussed trial of passage vs. Surgical intervention. Patient would like to proceed with surgical intervention at this time.   - NPO  - Add on for Cystoscopy, Bilateral retrograde Pyelogram, left ureteral stent placement/exchange     All risks, benefits and alternatives to surgery were discussed preoperatively with the patient and/or family/POA/next of kin including but not limited to need for multiple procedures, infection,  sepsis, bleeding, risk of anesthesia and damage to other organs. The details of the procedure have been fully reviewed with the patient and informed consent has been obtained and signed.          CHRISTEN Martinez  [facility]  [id number] [address], [address]  [place] [id number]  Office: [phone]  Available via Epic Secure Chat  12/11/24  13:20 EST   Plan reviewed and discussed with Dr. Barker.

## 2024-12-11 NOTE — ANESTHESIA PROCEDURE NOTES
Airway  Urgency: elective    Date/Time: 12/11/2024 4:14 PM  Airway not difficult    General Information and Staff    Patient location during procedure: OR  Anesthesiologist: Zeus Iyer MD  CRNA/CAA: Grace Tsang CRNA  SRNA: Moses Frazier SRNA  Indications and Patient Condition  Indications for airway management: airway protection    Preoxygenated: yes  MILS maintained throughout  Mask difficulty assessment: 1 - vent by mask    Final Airway Details  Final airway type: supraglottic airway      Successful airway: I-gel  Size 4     Number of attempts at approach: 1  Assessment: lips, teeth, and gum same as pre-op and atraumatic intubation

## 2024-12-11 NOTE — ANESTHESIA PREPROCEDURE EVALUATION
Anesthesia Evaluation     Patient summary reviewed and Nursing notes reviewed   NPO Solid Status: > 6 hours  NPO Liquid Status: > 4 hours           Airway   Mallampati: II  TM distance: >3 FB  Neck ROM: full  No difficulty expected  Dental - normal exam     Pulmonary    Cardiovascular         Neuro/Psych  (+) psychiatric history ADHD  GI/Hepatic/Renal/Endo    (+) renal disease- stones    Musculoskeletal     Abdominal    Substance History      OB/GYN          Other                    Anesthesia Plan    ASA 2     general     (Ate breakfast at 8 AM)  intravenous induction     Anesthetic plan, risks, benefits, and alternatives have been provided, discussed and informed consent has been obtained with: patient.    Plan discussed with CRNA.    CODE STATUS:

## 2024-12-11 NOTE — PROGRESS NOTES
Chief Complaint  Abdominal Pain    HPI:    June Reid presents to [facility]    Patient is a 39-year-old female with a history of ADD presenting for evaluation of abdominal discomfort.     Patient recently seen in [place] urgent care on 11/24/2024 for abdominal pain and discolored urine that started 2 days prior to evaluation.  Patient with history of previous kidney stones.  Vitals notable for /88.  Exam notable for left CVA tenderness, otherwise unremarkable.  Urine notable for cloudy urine with 25 leukocytes and blood.  Patient was prescribed Cipro for possible UTI and recommended to follow-up with emergency department if new or worsening symptoms. She stopped antibiotics as she was told to stop antibiotics as she was told that she did not have antibiotics.     Patient reports that since urgent care, she has had intermittent aching abdominal pain that worsened last night. She was having severe left abdominal pain and flank pain that was causing nausea and voming. Pain described as intermittent, non-radiating, sharp/stabbing pain. Nothing really makes better or worse other than taking ibuprofen. Pain currently improved. Denies dysuria, urgency, frequency, hematuria, fever, chills. Denies diarrhea, constipation, melena, hematochezia, or jaundice. She does have a remote history of kidney stones and followed up with urologist. Recent symptoms similar to previous episodes of kidney stone. Patient staying well hydrated. Denies new sick contacts, food/water exposures, travel, or antibiotics. Denies prior abdominal or pelvic surgeries.    Review of Systems:  ROS negative unless otherwise noted in HPI above.    Past Medical History:   Diagnosis Date    ADHD (attention deficit hyperactivity disorder) 2018    Allergic     HL (hearing loss) 1990    Due to ear drum replacement    Kidney stone     Low back pain 2000    Intermittent issues-degenerative disc         Current  "Outpatient Medications:     dexmethylphenidate XR (Focalin XR) 20 MG 24 hr capsule, Take 1 capsule by mouth Daily for 30 days, Disp: 30 capsule, Rfl: 0    levonorgestrel (MIRENA) 20 MCG/24HR IUD, 1 each by Intrauterine route 1 (One) Time., Disp: , Rfl:     Social History     Socioeconomic History    Marital status:    Tobacco Use    Smoking status: Never    Smokeless tobacco: Never   Vaping Use    Vaping status: Never Used   Substance and Sexual Activity    Alcohol use: Yes     Alcohol/week: 1.0 standard drink of alcohol     Types: 1 Glasses of wine per week     Comment: occ    Drug use: Never    Sexual activity: Yes     Partners: Male     Birth control/protection: I.U.D.        Objective   Vital Signs:  /78   Pulse 86   Resp 18   Ht 172.7 cm (68\")   Wt 88.3 kg (194 lb 9.6 oz)   SpO2 98%   BMI 29.59 kg/m²   Estimated body mass index is 29.59 kg/m² as calculated from the following:    Height as of this encounter: 172.7 cm (68\").    Weight as of this encounter: 88.3 kg (194 lb 9.6 oz).    Physical Exam:  General: Tired-appearing patient, no apparent distress  HEENT: No posterior pharynx erythema, no tonsillar erythema or exudates,  Cardiac: Regular rate and rhythm, normal S1/S2, no murmur, rubs or gallops, no lower extremity edema  Lungs: Clear to auscultation bilaterally, no crackles or wheezes  Abdomen: Soft, slight tenderness to palpation of the left side with left lower quadrant greater than upper quadrant, no guarding or rebound tenderness, no hepatosplenomegaly  Skin: No significant rashes or lesions  MSK: Grossly normal tone and strength, left CVA tenderness  Neuro: Alert and oriented x3, CN II-XII grossly intact  Psych: Appropriate mood and affect    Assessment and Plan:    (R10.9) Left lateral abdominal pain  (R10.9) Left flank pain  (Z87.442) History of kidney stones  Assessment: Patient with a history of kidney stones presenting with worsening left-sided abdominal pain and flank pain that " has persisted despite prior urgent care evaluation and course of ciprofloxacin.  Symptoms overall slightly improved, although were severe enough to consider going to emergency department last night.  Exam notable for abdominal tenderness to palpation and left-sided flank pain.  Discussed potential labs, imaging, and referral as an outpatient versus expedited ED visit.  After discussion, patient and  prefers to be seen in ED to expedite labs, imaging, and potential treatment, especially if urology required for kidney stone.  Plan:  - Patient to present to [facility] ED for further evaluation      Patient was given instructions and counseling regarding her condition or for health maintenance advice. Please see specific information pulled into the AVS if appropriate.       Dr Karel Mattson   Internal Medicine Physician  [facility]  [address], [address]  [place] [address]

## 2024-12-12 LAB — BACTERIA SPEC AEROBE CULT: NO GROWTH

## 2024-12-17 LAB
CALCIUM OXALATE DIHYDRATE MFR STONE IR: 10 %
COLOR STONE: NORMAL
COM MFR STONE: 70 %
COMPN STONE: NORMAL
HYDROXYAPATITE: 20 %
LABORATORY COMMENT REPORT: NORMAL
LABORATORY COMMENT REPORT: NORMAL
Lab: NORMAL
Lab: NORMAL
PHOTO: NORMAL
SIZE STONE: NORMAL MM
SPEC SOURCE SUBJ: NORMAL
STONE ANALYSIS-IMP: NORMAL
WT STONE: 29 MG

## 2025-01-20 DIAGNOSIS — F98.8 ATTENTION DEFICIT DISORDER (ADD) WITHOUT HYPERACTIVITY: ICD-10-CM

## 2025-01-20 NOTE — TELEPHONE ENCOUNTER
Caller: June Reid    Relationship: Self    Best call back number: [phone]     Requested Prescriptions:   Requested Prescriptions     Pending Prescriptions Disp Refills    dexmethylphenidate XR (Focalin XR) 20 MG 24 hr capsule 30 capsule 0     Sig: Take 1 capsule by mouth Daily for 30 days        Pharmacy where request should be sent: [address] [phone] [facility] [phone] [facility]     Last office visit with prescribing clinician: 12/11/2024   Last telemedicine visit with prescribing clinician: Visit date not found   Next office visit with prescribing clinician: Visit date not found   PATIENT HAS 3 DAYS OF MEDICINE LEFT      Does the patient have less than a 3 day supply:  [] Yes  [x] No      Steve Britt Rep   01/20/25 12:10 EST

## 2025-01-21 RX ORDER — DEXMETHYLPHENIDATE HYDROCHLORIDE 20 MG/1
20 CAPSULE, EXTENDED RELEASE ORAL DAILY
Qty: 30 CAPSULE | Refills: 0 | Status: SHIPPED | OUTPATIENT
Start: 2025-01-21 | End: 2025-02-20

## 2025-02-24 DIAGNOSIS — F98.8 ATTENTION DEFICIT DISORDER (ADD) WITHOUT HYPERACTIVITY: ICD-10-CM

## 2025-02-24 NOTE — TELEPHONE ENCOUNTER
Caller: June Reid    Relationship: Self    Best call back number: [phone]    Requested Prescriptions:   Requested Prescriptions     Pending Prescriptions Disp Refills    dexmethylphenidate XR (Focalin XR) 20 MG 24 hr capsule 30 capsule 0     Sig: Take 1 capsule by mouth Daily for 30 days        Pharmacy where request should be sent: [address] [phone] [facility] [phone]     Last office visit with prescribing clinician: 12/11/2024   Last telemedicine visit with prescribing clinician: Visit date not found   Next office visit with prescribing clinician: Visit date not found     Additional details provided by patient: HAS 2 DAYS OF MEDICATIONS     Does the patient have less than a 3 day supply:  [x] Yes  [] No    Steve Mcdaniels Rep   02/24/25 14:59 EST

## 2025-02-25 RX ORDER — DEXMETHYLPHENIDATE HYDROCHLORIDE 20 MG/1
20 CAPSULE, EXTENDED RELEASE ORAL DAILY
Qty: 30 CAPSULE | Refills: 0 | Status: SHIPPED | OUTPATIENT
Start: 2025-02-25 | End: 2025-03-27

## 2025-02-25 NOTE — TELEPHONE ENCOUNTER
Patient needs a office visit for annual preventative care visit and follow-up of ADHD medications.  Patient has not had preventative care visit and have not addressed ADHD since December 2023.  Will fill this month but will not fill moving forward without future appointment.      Karel Mattson MD

## 2025-02-25 NOTE — TELEPHONE ENCOUNTER
I spoke with patient about prescription and transferred her to appointment desk to schedule appointment.

## 2025-04-04 ENCOUNTER — LAB (OUTPATIENT)
Dept: FAMILY MEDICINE CLINIC | Facility: CLINIC | Age: 40
End: 2025-04-04
Payer: COMMERCIAL

## 2025-04-04 ENCOUNTER — OFFICE VISIT (OUTPATIENT)
Dept: FAMILY MEDICINE CLINIC | Facility: CLINIC | Age: 40
End: 2025-04-04
Payer: COMMERCIAL

## 2025-04-04 VITALS
HEIGHT: 68 IN | OXYGEN SATURATION: 98 % | DIASTOLIC BLOOD PRESSURE: 88 MMHG | HEART RATE: 80 BPM | WEIGHT: 195.6 LBS | RESPIRATION RATE: 18 BRPM | BODY MASS INDEX: 29.64 KG/M2 | SYSTOLIC BLOOD PRESSURE: 122 MMHG

## 2025-04-04 DIAGNOSIS — Z13.1 DIABETES MELLITUS SCREENING: ICD-10-CM

## 2025-04-04 DIAGNOSIS — Z13.220 LIPID SCREENING: ICD-10-CM

## 2025-04-04 DIAGNOSIS — F98.8 ATTENTION DEFICIT DISORDER (ADD) WITHOUT HYPERACTIVITY: ICD-10-CM

## 2025-04-04 DIAGNOSIS — N20.0 NEPHROLITHIASIS: ICD-10-CM

## 2025-04-04 DIAGNOSIS — Z13.0 SCREENING FOR DEFICIENCY ANEMIA: ICD-10-CM

## 2025-04-04 DIAGNOSIS — F98.8 ATTENTION DEFICIT DISORDER (ADD) WITHOUT HYPERACTIVITY: Primary | ICD-10-CM

## 2025-04-04 LAB
AMPHET+METHAMPHET UR QL: NEGATIVE
AMPHETAMINES UR QL: NEGATIVE
ANION GAP SERPL CALCULATED.3IONS-SCNC: 10.5 MMOL/L (ref 5–15)
BARBITURATES UR QL SCN: NEGATIVE
BASOPHILS # BLD AUTO: 0.07 10*3/MM3 (ref 0–0.2)
BASOPHILS NFR BLD AUTO: 0.6 % (ref 0–1.5)
BENZODIAZ UR QL SCN: NEGATIVE
BUN SERPL-MCNC: 13 MG/DL (ref 6–20)
BUN/CREAT SERPL: 16.5 (ref 7–25)
BUPRENORPHINE SERPL-MCNC: NEGATIVE NG/ML
CALCIUM SPEC-SCNC: 9.7 MG/DL (ref 8.6–10.5)
CANNABINOIDS SERPL QL: NEGATIVE
CHLORIDE SERPL-SCNC: 101 MMOL/L (ref 98–107)
CHOLEST SERPL-MCNC: 258 MG/DL (ref 0–200)
CO2 SERPL-SCNC: 26.5 MMOL/L (ref 22–29)
COCAINE UR QL: NEGATIVE
CREAT SERPL-MCNC: 0.79 MG/DL (ref 0.57–1)
DEPRECATED RDW RBC AUTO: 39.4 FL (ref 37–54)
EGFRCR SERPLBLD CKD-EPI 2021: 97.1 ML/MIN/1.73
EOSINOPHIL # BLD AUTO: 0.1 10*3/MM3 (ref 0–0.4)
EOSINOPHIL NFR BLD AUTO: 0.9 % (ref 0.3–6.2)
ERYTHROCYTE [DISTWIDTH] IN BLOOD BY AUTOMATED COUNT: 12.5 % (ref 12.3–15.4)
GLUCOSE SERPL-MCNC: 96 MG/DL (ref 65–99)
HCT VFR BLD AUTO: 46.8 % (ref 34–46.6)
HDLC SERPL-MCNC: 54 MG/DL (ref 40–60)
HGB BLD-MCNC: 16 G/DL (ref 12–15.9)
IMM GRANULOCYTES # BLD AUTO: 0.11 10*3/MM3 (ref 0–0.05)
IMM GRANULOCYTES NFR BLD AUTO: 1 % (ref 0–0.5)
LDLC SERPL CALC-MCNC: 173 MG/DL (ref 0–100)
LDLC/HDLC SERPL: 3.15 {RATIO}
LYMPHOCYTES # BLD AUTO: 1.88 10*3/MM3 (ref 0.7–3.1)
LYMPHOCYTES NFR BLD AUTO: 17.3 % (ref 19.6–45.3)
MCH RBC QN AUTO: 29.8 PG (ref 26.6–33)
MCHC RBC AUTO-ENTMCNC: 34.2 G/DL (ref 31.5–35.7)
MCV RBC AUTO: 87.2 FL (ref 79–97)
METHADONE UR QL SCN: NEGATIVE
MONOCYTES # BLD AUTO: 0.99 10*3/MM3 (ref 0.1–0.9)
MONOCYTES NFR BLD AUTO: 9.1 % (ref 5–12)
NEUTROPHILS NFR BLD AUTO: 7.71 10*3/MM3 (ref 1.7–7)
NEUTROPHILS NFR BLD AUTO: 71.1 % (ref 42.7–76)
NRBC BLD AUTO-RTO: 0 /100 WBC (ref 0–0.2)
OPIATES UR QL: NEGATIVE
OXYCODONE UR QL SCN: NEGATIVE
PCP UR QL SCN: NEGATIVE
PLATELET # BLD AUTO: 396 10*3/MM3 (ref 140–450)
PMV BLD AUTO: 9.8 FL (ref 6–12)
POTASSIUM SERPL-SCNC: 4.3 MMOL/L (ref 3.5–5.2)
RBC # BLD AUTO: 5.37 10*6/MM3 (ref 3.77–5.28)
SODIUM SERPL-SCNC: 138 MMOL/L (ref 136–145)
TRICYCLICS UR QL SCN: NEGATIVE
TRIGL SERPL-MCNC: 169 MG/DL (ref 0–150)
VLDLC SERPL-MCNC: 31 MG/DL (ref 5–40)
WBC NRBC COR # BLD AUTO: 10.86 10*3/MM3 (ref 3.4–10.8)

## 2025-04-04 PROCEDURE — 36415 COLL VENOUS BLD VENIPUNCTURE: CPT

## 2025-04-04 PROCEDURE — 85025 COMPLETE CBC W/AUTO DIFF WBC: CPT | Performed by: INTERNAL MEDICINE

## 2025-04-04 PROCEDURE — 80306 DRUG TEST PRSMV INSTRMNT: CPT | Performed by: INTERNAL MEDICINE

## 2025-04-04 PROCEDURE — 99214 OFFICE O/P EST MOD 30 MIN: CPT | Performed by: INTERNAL MEDICINE

## 2025-04-04 PROCEDURE — 80048 BASIC METABOLIC PNL TOTAL CA: CPT | Performed by: INTERNAL MEDICINE

## 2025-04-04 PROCEDURE — 80061 LIPID PANEL: CPT | Performed by: INTERNAL MEDICINE

## 2025-04-04 RX ORDER — DEXMETHYLPHENIDATE HYDROCHLORIDE 20 MG/1
20 CAPSULE, EXTENDED RELEASE ORAL DAILY
Qty: 30 CAPSULE | Refills: 0 | Status: SHIPPED | OUTPATIENT
Start: 2025-04-04 | End: 2025-05-04

## 2025-04-04 NOTE — PATIENT INSTRUCTIONS
Please go to the second floor for labs    Medications:  Continue medications as prescribed    Encourage healthy diet and exercise    Follow up for preventative care visit in 6 months

## 2025-05-12 DIAGNOSIS — F98.8 ATTENTION DEFICIT DISORDER (ADD) WITHOUT HYPERACTIVITY: ICD-10-CM

## 2025-05-13 RX ORDER — DEXMETHYLPHENIDATE HYDROCHLORIDE 20 MG/1
20 CAPSULE, EXTENDED RELEASE ORAL DAILY
Qty: 30 CAPSULE | Refills: 0 | Status: SHIPPED | OUTPATIENT
Start: 2025-05-13 | End: 2025-06-12

## 2025-06-13 DIAGNOSIS — F98.8 ATTENTION DEFICIT DISORDER (ADD) WITHOUT HYPERACTIVITY: ICD-10-CM

## 2025-06-13 RX ORDER — DEXMETHYLPHENIDATE HYDROCHLORIDE 20 MG/1
20 CAPSULE, EXTENDED RELEASE ORAL DAILY
Qty: 30 CAPSULE | Refills: 0 | Status: SHIPPED | OUTPATIENT
Start: 2025-06-13 | End: 2025-07-13

## 2025-06-13 NOTE — TELEPHONE ENCOUNTER
Caller: June Reid    Relationship: Self    Best call back number:     Requested Prescriptions:   Requested Prescriptions     Pending Prescriptions Disp Refills    dexmethylphenidate XR (Focalin XR) 20 MG 24 hr capsule 30 capsule 0     Sig: Take 1 capsule by mouth Daily for 30 days        Pharmacy where request should be sent: [address] [phone] [facility] [phone]     Last office visit with prescribing clinician: 4/4/2025   Last telemedicine visit with prescribing clinician: Visit date not found   Next office visit with prescribing clinician: 10/3/2025     Additional details provided by patient:     Does the patient have less than a 3 day supply:  [x] Yes  [] No    Steve Birmingham   06/13/25 13:47 EDT

## 2025-07-14 DIAGNOSIS — F98.8 ATTENTION DEFICIT DISORDER (ADD) WITHOUT HYPERACTIVITY: ICD-10-CM

## 2025-07-14 NOTE — TELEPHONE ENCOUNTER
OUT OF [facility]S      Caller: June Reid    Relationship: Self    Best call back number: [phone] (Mobile)     Requested Prescriptions:   Requested Prescriptions     Pending Prescriptions Disp Refills    dexmethylphenidate XR (Focalin XR) 20 MG 24 hr capsule 30 capsule 0     Sig: Take 1 capsule by mouth Daily for 30 days        Pharmacy where request should be sent: [address] [phone] [facility] [phone]     Last office visit with prescribing clinician: 4/4/2025   Last telemedicine visit with prescribing clinician: Visit date not found   Next office visit with prescribing clinician: 10/3/2025     Additional details provided by patient:     Does the patient have less than a 3 day supply:  [x] Yes  [] No    Would you like a call back once the refill request has been completed: [] Yes [] No    If the office needs to give you a call back, can they leave a voicemail: [] Yes [] No    Steve Marsh   07/14/25 16:28 EDT

## 2025-07-16 RX ORDER — DEXMETHYLPHENIDATE HYDROCHLORIDE 20 MG/1
20 CAPSULE, EXTENDED RELEASE ORAL DAILY
Qty: 30 CAPSULE | Refills: 0 | Status: SHIPPED | OUTPATIENT
Start: 2025-07-16 | End: 2025-08-15

## 2025-08-15 DIAGNOSIS — F98.8 ATTENTION DEFICIT DISORDER (ADD) WITHOUT HYPERACTIVITY: ICD-10-CM

## 2025-08-15 RX ORDER — DEXMETHYLPHENIDATE HYDROCHLORIDE 20 MG/1
20 CAPSULE, EXTENDED RELEASE ORAL DAILY
Qty: 30 CAPSULE | Refills: 0 | Status: SHIPPED | OUTPATIENT
Start: 2025-08-15 | End: 2025-09-14

## (undated) DEVICE — FIBR LASR HOLMIUM 365 MICRON DISP

## (undated) DEVICE — PK CYSTO 50

## (undated) DEVICE — GLV SURG SIGNATURE ESSENTIAL PF LTX SZ7

## (undated) DEVICE — SYR LUERLOK 30CC

## (undated) DEVICE — NITINOL STONE RETRIEVAL BASKET: Brand: ZERO TIP

## (undated) DEVICE — TUBING, SUCTION, 1/4" X 12', STRAIGHT: Brand: MEDLINE

## (undated) DEVICE — PREP SPRY PVPI 10P 2OZ

## (undated) DEVICE — SOL IRR H2O BO 1000ML STRL

## (undated) DEVICE — DUAL LUMEN URETERAL CATHETER

## (undated) DEVICE — NITINOL WIRE WITH HYDROPHILIC TIP: Brand: SENSOR

## (undated) DEVICE — KT SURG TURNOVER 050